# Patient Record
Sex: MALE | Race: WHITE | NOT HISPANIC OR LATINO | Employment: FULL TIME | URBAN - METROPOLITAN AREA
[De-identification: names, ages, dates, MRNs, and addresses within clinical notes are randomized per-mention and may not be internally consistent; named-entity substitution may affect disease eponyms.]

---

## 2018-05-02 ENCOUNTER — PATIENT OUTREACH (OUTPATIENT)
Dept: ADMINISTRATIVE | Facility: HOSPITAL | Age: 63
End: 2018-05-02

## 2018-05-02 NOTE — PROGRESS NOTES
A letter was mailed to the patient on today in regards to the information below.    The patient is due for a hypertension follow up and fasting blood work w/ urine. The patient is also due for immunizations(tetanus and zoster) and a colonoscopy.

## 2018-07-09 ENCOUNTER — OFFICE VISIT (OUTPATIENT)
Dept: URGENT CARE | Facility: CLINIC | Age: 63
End: 2018-07-09
Payer: COMMERCIAL

## 2018-07-09 VITALS
TEMPERATURE: 99 F | HEART RATE: 73 BPM | OXYGEN SATURATION: 96 % | HEIGHT: 69 IN | DIASTOLIC BLOOD PRESSURE: 91 MMHG | BODY MASS INDEX: 23.7 KG/M2 | WEIGHT: 160 LBS | SYSTOLIC BLOOD PRESSURE: 133 MMHG

## 2018-07-09 DIAGNOSIS — Z72.0 TOBACCO USE: ICD-10-CM

## 2018-07-09 DIAGNOSIS — S50.812A ABRASION OF LEFT FOREARM, INITIAL ENCOUNTER: Primary | ICD-10-CM

## 2018-07-09 DIAGNOSIS — S59.912A INJURY OF LEFT FOREARM, INITIAL ENCOUNTER: ICD-10-CM

## 2018-07-09 PROCEDURE — 3080F DIAST BP >= 90 MM HG: CPT | Mod: CPTII,S$GLB,, | Performed by: FAMILY MEDICINE

## 2018-07-09 PROCEDURE — 3075F SYST BP GE 130 - 139MM HG: CPT | Mod: CPTII,S$GLB,, | Performed by: FAMILY MEDICINE

## 2018-07-09 PROCEDURE — 99203 OFFICE O/P NEW LOW 30 MIN: CPT | Mod: S$GLB,,, | Performed by: FAMILY MEDICINE

## 2018-07-09 PROCEDURE — 3008F BODY MASS INDEX DOCD: CPT | Mod: CPTII,S$GLB,, | Performed by: FAMILY MEDICINE

## 2018-07-09 RX ORDER — MUPIROCIN 20 MG/G
OINTMENT TOPICAL
Qty: 22 G | Refills: 1 | Status: SHIPPED | OUTPATIENT
Start: 2018-07-09 | End: 2023-04-26

## 2018-07-09 RX ORDER — NAPROXEN 500 MG/1
500 TABLET ORAL 2 TIMES DAILY WITH MEALS
Qty: 60 TABLET | Refills: 2 | Status: SHIPPED | OUTPATIENT
Start: 2018-07-09 | End: 2019-07-09

## 2018-07-09 NOTE — PROGRESS NOTES
"Subjective:       Patient ID: Dieter Ayala is a 63 y.o. male.    Vitals:  height is 5' 9" (1.753 m) and weight is 72.6 kg (160 lb). His temperature is 98.7 °F (37.1 °C). His blood pressure is 133/91 (abnormal) and his pulse is 73. His oxygen saturation is 96%.     Chief Complaint: Arm Injury    Arm Injury    The incident occurred 1 to 3 hours ago. The incident occurred at home. The injury mechanism was a direct blow. The pain is present in the left forearm. The quality of the pain is described as aching and burning. The pain does not radiate. The pain is at a severity of 10/10. The pain is severe. The pain has been constant since the incident. Pertinent negatives include no chest pain or numbness. The symptoms are aggravated by movement and lifting. He has tried nothing for the symptoms.     Review of Systems   Constitution: Negative for weakness and malaise/fatigue.   HENT: Negative for nosebleeds.    Cardiovascular: Negative for chest pain and syncope.   Respiratory: Negative for shortness of breath.    Musculoskeletal: Positive for joint pain and joint swelling. Negative for back pain and neck pain.   Gastrointestinal: Negative for abdominal pain.   Genitourinary: Negative for hematuria.   Neurological: Negative for dizziness and numbness.   All other systems reviewed and are negative.      Objective:      Physical Exam   Constitutional: He is oriented to person, place, and time. He appears well-developed.   HENT:   Head: Normocephalic.   Nose: Nose normal.   Mouth/Throat: Oropharynx is clear and moist.   Eyes: Conjunctivae and EOM are normal. Pupils are equal, round, and reactive to light.   Cardiovascular: Normal rate and regular rhythm.    Pulmonary/Chest: Breath sounds normal.   Abdominal: Bowel sounds are normal.   Musculoskeletal:        Right forearm: He exhibits tenderness, swelling, edema and laceration.   Neurological: He is alert and oriented to person, place, and time.   Skin: Abrasion, bruising and " "ecchymosis noted. There is erythema.        Contusion on left forearm with large skin tear. Minimal active bleeding.       Assessment:       1. Abrasion of left forearm, initial encounter    2. Injury of left forearm, initial encounter    3. Tobacco use        Plan:         Abrasion of left forearm, initial encounter  -     mupirocin (BACTROBAN) 2 % ointment; Apply to affected area 3 times daily  Dispense: 22 g; Refill: 1  -     elastic bandage 3 X 5 "-yard Bndg; Apply 1 application topically continuous. Applied in clinic    Injury of left forearm, initial encounter  -     X-Ray Forearm Left; Future; Expected date: 07/09/2018  -     naproxen (NAPROSYN) 500 MG tablet; Take 1 tablet (500 mg total) by mouth 2 (two) times daily with meals.  Dispense: 60 tablet; Refill: 2  -     elastic bandage 3 X 5 "-yard Bndg; Apply 1 application topically continuous. Applied in clinic    Tobacco use  -     Ambulatory referral to Smoking Cessation Program      Patient Instructions     Skin Tear (Skin Avulsion)  A skin avulsion is a tearing of the top layer of skin. This commonly happens after a fall or other injury. It also tends to be more common in older people, or those taking blood thinners or steroids for long periods of time.  Home care  These guidelines will help you care for your wound at home:  · Keep the wound clean and dry for the first 24 to 48 hours, or as your healthcare provider advises.  · If there is a dressing or bandage, change it when it gets wet or dirty. Otherwise, leave it on for the first 24 hours, then change it once a day or as often as the doctor says.  · If stitches or staples were used, check the wound every day.  · After taking off the dressing, wash the area gently with soap and water. Clean as close to the stitches as you can. Avoid washing or rubbing the stitches directly.  · After 3 days you can keep the bandages off the wound, unless told otherwise, or there is continued drainage.  Allow the wound to " be open to the air.  · Keep a thin layer of antibiotic ointment on the cut. This will keep the wound clean, make it easier to remove the stitches, and reduce scarring.  · If your wound is oozing, you can put a nonstick dressing over it. Then, reapply the bandage or dressing as you were told.  · You can shower as usual after the first 24 hours, but don't soak the area in water (no baths or swimming) until the stitches or staples are taken out.  · If surgical tape was used, keep the area clean and dry. If it becomes wet, blot it dry with a clean towel.  · If skin glue was used, don't put any creams, lotions, or antibiotic ointments on it. These can dissolve the glue. Usually the glue will flake off in about 5 to 10 days by itself. Try to resist picking it off before that so the wound doesn't open up. When it gets wet, pat it dry.  Here is some information about medicine:  · You may use over-the-counter medicine such as acetaminophen or ibuprofen to control pain, unless another pain medicine was given. If you have chronic liver or kidney disease or ever had a stomach ulcer or gastrointestinal bleeding, talk with your doctor before using these medicines.  · If you were given antibiotics, take them until they are all used up. It is important to finish the antibiotics even if the wound looks better. This will ensure that the infection has cleared.  Follow-up care  Follow up with your healthcare provider, or as advised.  · Watch for any signs of infection, such as increasing redness, swelling, or pus coming out. If this happens, don't wait for your scheduled visit. Instead, see a doctor sooner.  · Stitches or staples are usually taken out within 5 to 14 days. This varies depending on what part of your body they are on, and the type of wound. The doctor will tell you how long stitches should be left in.   · If surgical tape was used, it is usually left on for 7 to 10 days. You can remove surgical tape after that unless you  were told otherwise. If you try to remove it, and it is too hard, soaking can help. Surgical tape strips will eventually fall off on their own. If the edges of the cut pull apart, stop removing the tape or strips and follow up with your doctor  · As mentioned above, skin glue will flake off by itself in 5 to 10 days, so you don't need to pull it off.  If any X-rays were done, you will be notified of any changes that may affect your care.  When to seek medical advice  Call your healthcare provider right away if any of these occur:  · Increasing pain in the wound  · Redness, swelling, or pus coming from the wound  · Fever of 100.4ºF (38ºC) or higher, or as directed by your healthcare provider  · Sutures or staples come apart or fall out before your next appointment and the wound edges look as if they will re-open  · Surgical tape closures fall off before 7 days, and the wound edges look as if they will re-open  · Bleeding not controlled by direct pressure  Date Last Reviewed: 9/1/2016 © 2000-2017 [employer] [address] [place] [address]. All rights reserved. This information is not intended as a substitute for professional medical care. Always follow your healthcare professional's instructions.        Upper Extremity Contusion  You have a contusion (bruise) of an upper extremity (arm, wrist, hand, or fingers). Symptoms include pain, swelling, and skin discoloration. No bones are broken. This injury may take from a few days to a few weeks to heal.  During that time, the bruise may change from reddish in color, to purple-blue, to green-yellow, to yellow-brown.  Home care  · Unless another medicine was prescribed, you can take acetaminophen, ibuprofen, or naproxen to control pain. (If you have chronic liver or kidney disease or ever had a stomach ulcer or gastrointestinal bleeding, talk with your doctor before using these medicines.)  · Elevate the injured area to reduce pain and swelling. As  much as possible, sit or lie down with the injured area raised about the level of your heart. This is especially important during the first 48 hours.  · Ice the injured area to help reduce pain and swelling. Wrap a cold source (ice pack or ice cubes in a plastic bag) in a thin towel. Apply to the bruised area for 20 minutes every 1 to 2 hours the first day. Continue this 3 to 4 times a day until the pain and swelling goes away.  · If a sling was provided, you may remove it to shower or bathe. To prevent joint stiffness, do not wear it for more than 1 week.  Follow-up care  Follow up with your healthcare provide, or as advised. Call if you are not improving within the next 1 to 2 weeks.  When to seek medical advice   Call your healthcare provider right away if any of these occur:  · Increased pain or swelling  · Hand or fingers become cold, blue, numb or tingly  · Signs of infection: Warmth, drainage, or increased redness or pain around the injury  · Inability to move the injured body part   · Frequent bruising for unknown reasons  Date Last Reviewed: 2/1/2017  © 6934-9009 [employer]. [address] [place] [address], [place] [address]. All rights reserved. This information is not intended as a substitute for professional medical care. Always follow your healthcare professional's instructions.

## 2018-07-09 NOTE — PATIENT INSTRUCTIONS
Skin Tear (Skin Avulsion)  A skin avulsion is a tearing of the top layer of skin. This commonly happens after a fall or other injury. It also tends to be more common in older people, or those taking blood thinners or steroids for long periods of time.  Home care  These guidelines will help you care for your wound at home:  · Keep the wound clean and dry for the first 24 to 48 hours, or as your healthcare provider advises.  · If there is a dressing or bandage, change it when it gets wet or dirty. Otherwise, leave it on for the first 24 hours, then change it once a day or as often as the doctor says.  · If stitches or staples were used, check the wound every day.  · After taking off the dressing, wash the area gently with soap and water. Clean as close to the stitches as you can. Avoid washing or rubbing the stitches directly.  · After 3 days you can keep the bandages off the wound, unless told otherwise, or there is continued drainage.  Allow the wound to be open to the air.  · Keep a thin layer of antibiotic ointment on the cut. This will keep the wound clean, make it easier to remove the stitches, and reduce scarring.  · If your wound is oozing, you can put a nonstick dressing over it. Then, reapply the bandage or dressing as you were told.  · You can shower as usual after the first 24 hours, but don't soak the area in water (no baths or swimming) until the stitches or staples are taken out.  · If surgical tape was used, keep the area clean and dry. If it becomes wet, blot it dry with a clean towel.  · If skin glue was used, don't put any creams, lotions, or antibiotic ointments on it. These can dissolve the glue. Usually the glue will flake off in about 5 to 10 days by itself. Try to resist picking it off before that so the wound doesn't open up. When it gets wet, pat it dry.  Here is some information about medicine:  · You may use over-the-counter medicine such as acetaminophen or ibuprofen to control pain,  unless another pain medicine was given. If you have chronic liver or kidney disease or ever had a stomach ulcer or gastrointestinal bleeding, talk with your doctor before using these medicines.  · If you were given antibiotics, take them until they are all used up. It is important to finish the antibiotics even if the wound looks better. This will ensure that the infection has cleared.  Follow-up care  Follow up with your healthcare provider, or as advised.  · Watch for any signs of infection, such as increasing redness, swelling, or pus coming out. If this happens, don't wait for your scheduled visit. Instead, see a doctor sooner.  · Stitches or staples are usually taken out within 5 to 14 days. This varies depending on what part of your body they are on, and the type of wound. The doctor will tell you how long stitches should be left in.   · If surgical tape was used, it is usually left on for 7 to 10 days. You can remove surgical tape after that unless you were told otherwise. If you try to remove it, and it is too hard, soaking can help. Surgical tape strips will eventually fall off on their own. If the edges of the cut pull apart, stop removing the tape or strips and follow up with your doctor  · As mentioned above, skin glue will flake off by itself in 5 to 10 days, so you don't need to pull it off.  If any X-rays were done, you will be notified of any changes that may affect your care.  When to seek medical advice  Call your healthcare provider right away if any of these occur:  · Increasing pain in the wound  · Redness, swelling, or pus coming from the wound  · Fever of 100.4ºF (38ºC) or higher, or as directed by your healthcare provider  · Sutures or staples come apart or fall out before your next appointment and the wound edges look as if they will re-open  · Surgical tape closures fall off before 7 days, and the wound edges look as if they will re-open  · Bleeding not controlled by direct pressure  Date  Last Reviewed: 9/1/2016  © 3342-8081 [employer]. [address] [place] [address], [place] [address]. All rights reserved. This information is not intended as a substitute for professional medical care. Always follow your healthcare professional's instructions.        Upper Extremity Contusion  You have a contusion (bruise) of an upper extremity (arm, wrist, hand, or fingers). Symptoms include pain, swelling, and skin discoloration. No bones are broken. This injury may take from a few days to a few weeks to heal.  During that time, the bruise may change from reddish in color, to purple-blue, to green-yellow, to yellow-brown.  Home care  · Unless another medicine was prescribed, you can take acetaminophen, ibuprofen, or naproxen to control pain. (If you have chronic liver or kidney disease or ever had a stomach ulcer or gastrointestinal bleeding, talk with your doctor before using these medicines.)  · Elevate the injured area to reduce pain and swelling. As much as possible, sit or lie down with the injured area raised about the level of your heart. This is especially important during the first 48 hours.  · Ice the injured area to help reduce pain and swelling. Wrap a cold source (ice pack or ice cubes in a plastic bag) in a thin towel. Apply to the bruised area for 20 minutes every 1 to 2 hours the first day. Continue this 3 to 4 times a day until the pain and swelling goes away.  · If a sling was provided, you may remove it to shower or bathe. To prevent joint stiffness, do not wear it for more than 1 week.  Follow-up care  Follow up with your healthcare provide, or as advised. Call if you are not improving within the next 1 to 2 weeks.  When to seek medical advice   Call your healthcare provider right away if any of these occur:  · Increased pain or swelling  · Hand or fingers become cold, blue, numb or tingly  · Signs of infection: Warmth, drainage, or increased redness or pain around the injury  · Inability  to move the injured body part   · Frequent bruising for unknown reasons  Date Last Reviewed: 2/1/2017  © 8077-0136 [employer]. [address] [place] [address], [place] [address]. All rights reserved. This information is not intended as a substitute for professional medical care. Always follow your healthcare professional's instructions.

## 2019-02-27 ENCOUNTER — OCCUPATIONAL HEALTH (OUTPATIENT)
Dept: URGENT CARE | Facility: CLINIC | Age: 64
End: 2019-02-27

## 2019-02-27 PROCEDURE — 80305 PR COLLECTION ONLY DRUG SCREEN: ICD-10-PCS | Mod: S$GLB,,, | Performed by: EMERGENCY MEDICINE

## 2019-02-27 PROCEDURE — 82075 ASSAY OF BREATH ETHANOL: CPT | Mod: S$GLB,,, | Performed by: EMERGENCY MEDICINE

## 2019-02-27 PROCEDURE — 82075 CHG ASSAY OF BREATH ETHANOL: ICD-10-PCS | Mod: S$GLB,,, | Performed by: EMERGENCY MEDICINE

## 2019-02-27 PROCEDURE — 80305 DRUG TEST PRSMV DIR OPT OBS: CPT | Mod: S$GLB,,, | Performed by: EMERGENCY MEDICINE

## 2020-08-18 ENCOUNTER — OCCUPATIONAL HEALTH (OUTPATIENT)
Dept: URGENT CARE | Facility: CLINIC | Age: 65
End: 2020-08-18

## 2020-08-18 DIAGNOSIS — Z02.89 ENCOUNTER FOR PHYSICAL EXAMINATION RELATED TO EMPLOYMENT: ICD-10-CM

## 2020-08-18 PROCEDURE — 80305 PR COLLECTION ONLY DRUG SCREEN: ICD-10-PCS | Mod: S$GLB,,, | Performed by: EMERGENCY MEDICINE

## 2020-08-18 PROCEDURE — 82075 ASSAY OF BREATH ETHANOL: CPT | Mod: S$GLB,,, | Performed by: EMERGENCY MEDICINE

## 2020-08-18 PROCEDURE — 82075 CHG ASSAY OF BREATH ETHANOL: ICD-10-PCS | Mod: S$GLB,,, | Performed by: EMERGENCY MEDICINE

## 2020-08-18 PROCEDURE — 80305 DRUG TEST PRSMV DIR OPT OBS: CPT | Mod: S$GLB,,, | Performed by: EMERGENCY MEDICINE

## 2020-09-24 ENCOUNTER — OCCUPATIONAL HEALTH (OUTPATIENT)
Dept: URGENT CARE | Facility: CLINIC | Age: 65
End: 2020-09-24

## 2020-09-24 DIAGNOSIS — Z02.89 ENCOUNTER FOR PHYSICAL EXAMINATION RELATED TO EMPLOYMENT: ICD-10-CM

## 2020-09-24 PROCEDURE — 82075 CHG ASSAY OF BREATH ETHANOL: ICD-10-PCS | Mod: S$GLB,,, | Performed by: EMERGENCY MEDICINE

## 2020-09-24 PROCEDURE — 80305 DRUG TEST PRSMV DIR OPT OBS: CPT | Mod: S$GLB,,, | Performed by: EMERGENCY MEDICINE

## 2020-09-24 PROCEDURE — 80305 PR COLLECTION ONLY DRUG SCREEN: ICD-10-PCS | Mod: S$GLB,,, | Performed by: EMERGENCY MEDICINE

## 2020-09-24 PROCEDURE — 82075 ASSAY OF BREATH ETHANOL: CPT | Mod: S$GLB,,, | Performed by: EMERGENCY MEDICINE

## 2022-04-12 ENCOUNTER — HOSPITAL ENCOUNTER (EMERGENCY)
Facility: HOSPITAL | Age: 67
Discharge: HOME | End: 2022-04-13
Attending: INTERNAL MEDICINE
Payer: MEDICARE

## 2022-04-12 VITALS
OXYGEN SATURATION: 97 % | WEIGHT: 160 LBS | DIASTOLIC BLOOD PRESSURE: 97 MMHG | BODY MASS INDEX: 23.7 KG/M2 | HEART RATE: 74 BPM | SYSTOLIC BLOOD PRESSURE: 150 MMHG | RESPIRATION RATE: 14 BRPM | HEIGHT: 69 IN | TEMPERATURE: 98 F

## 2022-04-12 DIAGNOSIS — T15.91XA FOREIGN BODY OF RIGHT EYE, INITIAL ENCOUNTER: Primary | ICD-10-CM

## 2022-04-12 PROCEDURE — 99284 EMERGENCY DEPT VISIT MOD MDM: CPT | Mod: 25,ER

## 2022-04-12 PROCEDURE — 65222 REMOVE FOREIGN BODY FROM EYE: CPT | Mod: RT,ER

## 2022-04-12 PROCEDURE — 65220 REMOVE FOREIGN BODY FROM EYE: CPT | Mod: RT,ER

## 2022-04-12 RX ORDER — PROPARACAINE HYDROCHLORIDE 5 MG/ML
2 SOLUTION/ DROPS OPHTHALMIC
Status: COMPLETED | OUTPATIENT
Start: 2022-04-13 | End: 2022-04-13

## 2022-04-12 RX ADMIN — TETANUS TOXOID, REDUCED DIPHTHERIA TOXOID AND ACELLULAR PERTUSSIS VACCINE, ADSORBED 0.5 ML: 5; 2.5; 8; 8; 2.5 SUSPENSION INTRAMUSCULAR at 11:04

## 2022-04-13 ENCOUNTER — OFFICE VISIT (OUTPATIENT)
Dept: OPTOMETRY | Facility: CLINIC | Age: 67
End: 2022-04-13
Payer: MEDICARE

## 2022-04-13 ENCOUNTER — TELEPHONE (OUTPATIENT)
Dept: OPHTHALMOLOGY | Facility: CLINIC | Age: 67
End: 2022-04-13
Payer: MEDICARE

## 2022-04-13 DIAGNOSIS — H18.891 CORNEAL RUST RING OF RIGHT EYE: Primary | ICD-10-CM

## 2022-04-13 PROBLEM — T15.91XA FOREIGN BODY OF RIGHT EYE: Status: ACTIVE | Noted: 2022-04-13

## 2022-04-13 PROCEDURE — 92002 PR EYE EXAM, NEW PATIENT,INTERMED: ICD-10-PCS | Mod: 25,S$PBB,, | Performed by: OPTOMETRIST

## 2022-04-13 PROCEDURE — 65222 REMOVE FOREIGN BODY FROM EYE: CPT | Mod: S$PBB,RT,, | Performed by: OPTOMETRIST

## 2022-04-13 PROCEDURE — 90471 IMMUNIZATION ADMIN: CPT | Mod: ER | Performed by: INTERNAL MEDICINE

## 2022-04-13 PROCEDURE — 99999 PR PBB SHADOW E&M-EST. PATIENT-LVL II: ICD-10-PCS | Mod: PBBFAC,,, | Performed by: OPTOMETRIST

## 2022-04-13 PROCEDURE — 99999 PR PBB SHADOW E&M-EST. PATIENT-LVL II: CPT | Mod: PBBFAC,,, | Performed by: OPTOMETRIST

## 2022-04-13 PROCEDURE — 90715 TDAP VACCINE 7 YRS/> IM: CPT | Mod: ER | Performed by: INTERNAL MEDICINE

## 2022-04-13 PROCEDURE — 92002 INTRM OPH EXAM NEW PATIENT: CPT | Mod: 25,S$PBB,, | Performed by: OPTOMETRIST

## 2022-04-13 PROCEDURE — 63600175 PHARM REV CODE 636 W HCPCS: Mod: ER | Performed by: INTERNAL MEDICINE

## 2022-04-13 PROCEDURE — 65222 REMOVE FOREIGN BODY FROM EYE: CPT | Mod: PBBFAC,PO,RT | Performed by: OPTOMETRIST

## 2022-04-13 PROCEDURE — 25000003 PHARM REV CODE 250: Mod: ER | Performed by: INTERNAL MEDICINE

## 2022-04-13 PROCEDURE — 99212 OFFICE O/P EST SF 10 MIN: CPT | Mod: PBBFAC,PO,25 | Performed by: OPTOMETRIST

## 2022-04-13 PROCEDURE — 65222 REMOVE FOREIGN BODY FROM EYE: CPT | Mod: RT,ER

## 2022-04-13 PROCEDURE — 65222 PR REMV F.B.,EYE,CORNEA,SLIT LAMP: ICD-10-PCS | Mod: S$PBB,RT,, | Performed by: OPTOMETRIST

## 2022-04-13 RX ORDER — OFLOXACIN 3 MG/ML
1 SOLUTION/ DROPS OPHTHALMIC 4 TIMES DAILY
Qty: 5 ML | Refills: 0 | Status: SHIPPED | OUTPATIENT
Start: 2022-04-13 | End: 2022-04-18

## 2022-04-13 RX ORDER — TOBRAMYCIN AND DEXAMETHASONE 3; 1 MG/ML; MG/ML
2 SUSPENSION/ DROPS OPHTHALMIC
Qty: 5 ML | Refills: 0 | Status: SHIPPED | OUTPATIENT
Start: 2022-04-13 | End: 2022-04-13

## 2022-04-13 RX ADMIN — PROPARACAINE HYDROCHLORIDE 2 DROP: 5 SOLUTION/ DROPS OPHTHALMIC at 12:04

## 2022-04-13 RX ADMIN — FLUORESCEIN SODIUM 1 EACH: 1 STRIP OPHTHALMIC at 12:04

## 2022-04-13 NOTE — PROGRESS NOTES
Subjective:       Patient ID: Dieter Ayala is a 66 y.o. male      Chief Complaint   Patient presents with    Concerns About Ocular Health     History of Present Illness  Dls: 3/18/16 Dr. Degroot    67 y/o male presents today with c/o x 1 day was grinding on metal and fb flew into od was seen in ER last night tried to remove it but could not get it out. Pt was given tobradex gtts od Q 4 Hrs.         Assessment/Plan:     1. Corneal rust ring of right eye  PROCEDURE NOTE:  Corneal foreign body removal. Patient gave verbal consent for removal of corneal foreign body. One drop of proparacaine was instilled in right  eye. Corneal foreign body removed with algerbrush behind slit lamp. Patient tolerated procedure well. No complications.     One drop vigamox  instilled in office.      FB removed in ER. Residual rust ring. Pt request removal. Stop tobradex. Switch to ofloxacin QID OD x 5 days. AT QID.     - ofloxacin (OCUFLOX) 0.3 % ophthalmic solution; Place 1 drop into the right eye 4 (four) times daily. for 5 days  Dispense: 5 mL; Refill: 0    Follow up if symptoms worsen or fail to improve.

## 2022-04-13 NOTE — ED PROVIDER NOTES
Encounter Date: 4/12/2022    SCRIBE #1 NOTE: I, Ivett Bob, am scribing for, and in the presence of,  Remi Minler MD. I have scribed the following portions of the note - Other sections scribed: HPI, ROS, PE.       History     Chief Complaint   Patient presents with    Foreign Body in Eye     Possible metallic foreign body in right eye from sharpening  blade earlier today. Denies vision loss/change. Pain and drainage reported from right eye.     Dieter Ayala is a 66 y.o. male who presents to the ED for evaluation of a foreign body present in his right eye onset earlier today. Pt reports that he was sharpening his  and the wind blew something in his eye. He states that he has right eye pain and discharge. Denies visual disturbance. Pt wife reports seeing the foreign body with a magnifying glass. She also mentions putting visine eye drops to relieve his symptoms.    The history is provided by the patient. No  was used.     Review of patient's allergies indicates:   Allergen Reactions    Sulfa (sulfonamide antibiotics) Hives     No past medical history on file.  Past Surgical History:   Procedure Laterality Date    ADENOIDECTOMY      APPENDECTOMY      TONSILLECTOMY       Family History   Problem Relation Age of Onset    Heart disease Mother     ALS Brother      Social History     Tobacco Use    Smoking status: Current Every Day Smoker   Substance Use Topics    Alcohol use: Yes     Review of Systems   Constitutional: Negative for diaphoresis and fever.   HENT: Negative for sore throat.    Eyes: Positive for pain and discharge. Negative for visual disturbance.   Respiratory: Negative for shortness of breath.    Cardiovascular: Negative for chest pain.   Gastrointestinal: Negative for abdominal pain and nausea.   Genitourinary: Negative for dysuria.   Musculoskeletal: Negative for back pain.   Skin: Negative for rash.   Neurological: Negative for weakness.   All  other systems reviewed and are negative.      Physical Exam     Initial Vitals [04/12/22 2307]   BP Pulse Resp Temp SpO2   (!) 150/97 74 14 97.9 °F (36.6 °C) 97 %      MAP       --         Physical Exam    Nursing note and vitals reviewed.  Constitutional: He appears well-developed and well-nourished.   HENT:   Head: Normocephalic and atraumatic.   Eyes: Conjunctivae are normal.   Slit lamp exam:       The right eye shows foreign body (1 o'clock of the iris). The right eye shows no corneal abrasion.   Neck: Neck supple.   Normal range of motion.  Cardiovascular: Normal rate, regular rhythm and normal heart sounds. Exam reveals no gallop and no friction rub.    No murmur heard.  Pulmonary/Chest: Breath sounds normal. No respiratory distress. He has no wheezes. He has no rhonchi. He has no rales.   Abdominal: Abdomen is soft. There is no abdominal tenderness.   Musculoskeletal:         General: No edema. Normal range of motion.      Cervical back: Normal range of motion and neck supple.     Neurological: He is alert and oriented to person, place, and time. GCS score is 15. GCS eye subscore is 4. GCS verbal subscore is 5. GCS motor subscore is 6.   Skin: Skin is warm and dry.   Psychiatric: He has a normal mood and affect.         ED Course   Foreign Body    Date/Time: 4/13/2022 6:06 AM  Performed by: Remi Milner MD  Authorized by: Remi Milner MD   Consent Done: Yes  Consent: Verbal consent obtained.  Risks and benefits: risks, benefits and alternatives were discussed  Consent given by: patient  Patient understanding: patient states understanding of the procedure being performed  Patient consent: the patient's understanding of the procedure matches consent given  Procedure consent: procedure consent matches procedure scheduled  Body area: eye  Location details: right cornea  Anesthesia: local infiltration    Anesthesia:  Local Anesthetic: proparacaine drops    Patient sedated: no  Patient restrained:  no  Localization method: magnification  Removal mechanism: ophthalmic dorcas  Eye examined with fluorescein.  No fluorescein uptake.  No residual rust ring present.  Complexity: simple  0 objects recovered.  Post-procedure assessment: foreign body not removed  Patient tolerance: Patient tolerated the procedure well with no immediate complications      Labs Reviewed - No data to display       Imaging Results    None          Medications   Tdap (BOOSTRIX) vaccine injection 0.5 mL (0.5 mLs Intramuscular Given 4/12/22 7991)   fluorescein ophthalmic strip 1 each (1 each Left Eye Given 4/13/22 0002)   proparacaine 0.5 % ophthalmic solution 2 drop (2 drops Left Eye Given 4/13/22 0002)     Medical Decision Making:   History:   Old Medical Records: I decided to obtain old medical records.  Initial Assessment:   Dieter Ayala is a 66 y.o. male who presents to the ED for evaluation of a foreign body present in his right eye onset earlier today. Pt reports that he was sharpening his  and the wind blew something in his eye. He states that he has right eye pain. Denies visual disturbance. Pt wife reports seeing the foreign body with a magnifying glass. She also mentions putting visine eye drops to relieve his symptoms.  ED Management:  Foreign body was not removed at this time.  Patient was given prescription for TobraDex and instructions for foreign body in the eye.  Referral to Ophthalmology was given for tomorrow and patient was advised to return to the emergency department if condition worsens.          Scribe Attestation:   Scribe #1: I performed the above scribed service and the documentation accurately describes the services I performed. I attest to the accuracy of the note.               This document was produced by a scribe under my direction and in my presence. I agree with the content of the note and have made any necessary edits.     Dr. Milner    04/13/2022 6:08 AM    Clinical Impression:   Final  diagnoses:  [T15.91XA] Foreign body of right eye, initial encounter (Primary)          ED Disposition Condition    Discharge Stable        ED Prescriptions     Medication Sig Dispense Start Date End Date Auth. Provider    tobramycin-dexamethasone 0.3-0.1% (TOBRADEX) 0.3-0.1 % DrpS Place 2 drops into the right eye every 4 (four) hours while awake. for 5 days 5 mL 4/13/2022 4/18/2022 Remi Milner MD        Follow-up Information     Follow up With Specialties Details Why Contact Info    J Carlos Degroot MD Ophthalmology Schedule an appointment as soon as possible for a visit today For reevaluation [address] [facility] [place] [id number]  [phone]             Remi Milner MD  04/13/22 0608

## 2022-04-13 NOTE — TELEPHONE ENCOUNTER
----- Message from Leah Diallo sent at 4/13/2022  8:14 AM CDT -----  Contact: Kailey @[phone]  Pt wife calling on behalf of her  because he got a piece of steal in his right eye and the ER was unable to get it out

## 2022-05-10 ENCOUNTER — OFFICE VISIT (OUTPATIENT)
Dept: FAMILY MEDICINE | Facility: CLINIC | Age: 67
End: 2022-05-10
Payer: MEDICARE

## 2022-05-10 VITALS
OXYGEN SATURATION: 95 % | TEMPERATURE: 98 F | WEIGHT: 139.31 LBS | BODY MASS INDEX: 20.63 KG/M2 | SYSTOLIC BLOOD PRESSURE: 136 MMHG | DIASTOLIC BLOOD PRESSURE: 82 MMHG | HEART RATE: 86 BPM | HEIGHT: 69 IN

## 2022-05-10 DIAGNOSIS — F17.200 TOBACCO DEPENDENCE: ICD-10-CM

## 2022-05-10 DIAGNOSIS — J20.9 ACUTE BRONCHITIS, UNSPECIFIED ORGANISM: Primary | ICD-10-CM

## 2022-05-10 DIAGNOSIS — R06.02 SHORTNESS OF BREATH: ICD-10-CM

## 2022-05-10 DIAGNOSIS — R05.9 COUGH: ICD-10-CM

## 2022-05-10 PROCEDURE — U0005 INFEC AGEN DETEC AMPLI PROBE: HCPCS | Performed by: STUDENT IN AN ORGANIZED HEALTH CARE EDUCATION/TRAINING PROGRAM

## 2022-05-10 PROCEDURE — 99999 PR PBB SHADOW E&M-EST. PATIENT-LVL III: ICD-10-PCS | Mod: PBBFAC,,, | Performed by: STUDENT IN AN ORGANIZED HEALTH CARE EDUCATION/TRAINING PROGRAM

## 2022-05-10 PROCEDURE — 99999 PR PBB SHADOW E&M-EST. PATIENT-LVL III: CPT | Mod: PBBFAC,,, | Performed by: STUDENT IN AN ORGANIZED HEALTH CARE EDUCATION/TRAINING PROGRAM

## 2022-05-10 PROCEDURE — 99213 PR OFFICE/OUTPT VISIT, EST, LEVL III, 20-29 MIN: ICD-10-PCS | Mod: S$PBB,,, | Performed by: STUDENT IN AN ORGANIZED HEALTH CARE EDUCATION/TRAINING PROGRAM

## 2022-05-10 PROCEDURE — 99213 OFFICE O/P EST LOW 20 MIN: CPT | Mod: PBBFAC,PO | Performed by: STUDENT IN AN ORGANIZED HEALTH CARE EDUCATION/TRAINING PROGRAM

## 2022-05-10 PROCEDURE — U0003 INFECTIOUS AGENT DETECTION BY NUCLEIC ACID (DNA OR RNA); SEVERE ACUTE RESPIRATORY SYNDROME CORONAVIRUS 2 (SARS-COV-2) (CORONAVIRUS DISEASE [COVID-19]), AMPLIFIED PROBE TECHNIQUE, MAKING USE OF HIGH THROUGHPUT TECHNOLOGIES AS DESCRIBED BY CMS-2020-01-R: HCPCS | Performed by: STUDENT IN AN ORGANIZED HEALTH CARE EDUCATION/TRAINING PROGRAM

## 2022-05-10 PROCEDURE — 99213 OFFICE O/P EST LOW 20 MIN: CPT | Mod: S$PBB,,, | Performed by: STUDENT IN AN ORGANIZED HEALTH CARE EDUCATION/TRAINING PROGRAM

## 2022-05-10 RX ORDER — METHYLPREDNISOLONE 4 MG/1
TABLET ORAL
Qty: 21 EACH | Refills: 0 | Status: SHIPPED | OUTPATIENT
Start: 2022-05-10 | End: 2022-05-31

## 2022-05-10 RX ORDER — AZITHROMYCIN 250 MG/1
TABLET, FILM COATED ORAL
Qty: 6 TABLET | Refills: 0 | Status: SHIPPED | OUTPATIENT
Start: 2022-05-10 | End: 2022-05-15

## 2022-05-10 NOTE — PROGRESS NOTES
"05/10/2022    Dieter Ayala  [id number]    Chief Complaint   Patient presents with    Cough       HPI    URI for the lat 1.5 weeks  Hx of bronchitis 2-3 years   Was coughing up green sputum but that has resolved  Now still with cough, SOB and clear-casie phlegm   Has taken some Nyquil  Does not use inhalers  Also with some diarrhea  No fevers  Tolerating regular diet  Tobacco dependece: 1 ppd        Negative 10 point ROS outside of HPI    Social History     Socioeconomic History    Marital status:    Tobacco Use    Smoking status: Current Every Day Smoker    Smokeless tobacco: Never Used   Substance and Sexual Activity    Alcohol use: Yes           Current Outpatient Medications:     elastic bandage 3 X 5 "-yard Bndg, Apply 1 application topically continuous. Applied in clinic (Patient not taking: Reported on 5/10/2022), Disp: , Rfl:     MULTIVIT-IRON-MIN-FOLIC ACID 3,500-18-0.4 UNIT-MG-MG ORAL CHEW, Take by mouth., Disp: , Rfl:     mupirocin (BACTROBAN) 2 % ointment, Apply to affected area 3 times daily (Patient not taking: Reported on 5/10/2022), Disp: 22 g, Rfl: 1      Physical Exam  Vitals:    05/10/22 1130   BP: 136/82   Pulse: 86   Temp: 97.6 °F (36.4 °C)     Gen: well appearing, NAD  Resp: non labored breathing, poor air movement, quiet at bases, upper lung fields clear, no crackles   CV: RRR no murmur, gallops, rubs, no LE edema    1. Acute bronchitis, unspecified organism  - methylPREDNISolone (MEDROL DOSEPACK) 4 mg tablet; use as directed  Dispense: 21 each; Refill: 0  - azithromycin (Z-BHAVANA) 250 MG tablet; Take 2 tablets by mouth on day 1; Take 1 tablet by mouth on days 2-5  Dispense: 6 tablet; Refill: 0    2. Shortness of breath  - COVID-19 Routine Screening    3. Cough  - COVID-19 Routine Screening    4. Tobacco dependence  discussed smoking cessation and like some undiagnosed chronic lung disease which could be contributing to his illness    RTC if worsening or no improvement in 1-2 " oracio Monson MD  Family Medicine

## 2022-05-11 LAB — SARS-COV-2 RNA RESP QL NAA+PROBE: NOT DETECTED

## 2022-08-23 ENCOUNTER — OFFICE VISIT (OUTPATIENT)
Dept: FAMILY MEDICINE | Facility: CLINIC | Age: 67
End: 2022-08-23
Payer: MEDICARE

## 2022-08-23 ENCOUNTER — HOSPITAL ENCOUNTER (OUTPATIENT)
Dept: RADIOLOGY | Facility: HOSPITAL | Age: 67
Discharge: HOME | End: 2022-08-23
Attending: INTERNAL MEDICINE
Payer: MEDICARE

## 2022-08-23 VITALS
HEART RATE: 69 BPM | SYSTOLIC BLOOD PRESSURE: 128 MMHG | BODY MASS INDEX: 19.3 KG/M2 | DIASTOLIC BLOOD PRESSURE: 88 MMHG | TEMPERATURE: 99 F | HEIGHT: 69 IN | OXYGEN SATURATION: 95 % | WEIGHT: 130.31 LBS

## 2022-08-23 DIAGNOSIS — R06.09 DOE (DYSPNEA ON EXERTION): ICD-10-CM

## 2022-08-23 DIAGNOSIS — F17.200 TOBACCO DEPENDENCE: ICD-10-CM

## 2022-08-23 DIAGNOSIS — Z12.12 SCREENING FOR COLORECTAL CANCER: ICD-10-CM

## 2022-08-23 DIAGNOSIS — N40.0 BENIGN PROSTATIC HYPERPLASIA, UNSPECIFIED WHETHER LOWER URINARY TRACT SYMPTOMS PRESENT: ICD-10-CM

## 2022-08-23 DIAGNOSIS — D69.2 SENILE PURPURA: ICD-10-CM

## 2022-08-23 DIAGNOSIS — R63.4 WEIGHT LOSS: Primary | ICD-10-CM

## 2022-08-23 DIAGNOSIS — T14.8XXA BRUISING: ICD-10-CM

## 2022-08-23 DIAGNOSIS — I10 ESSENTIAL HYPERTENSION: ICD-10-CM

## 2022-08-23 DIAGNOSIS — Z00.00 ROUTINE MEDICAL EXAM: ICD-10-CM

## 2022-08-23 DIAGNOSIS — Z12.11 SCREENING FOR COLORECTAL CANCER: ICD-10-CM

## 2022-08-23 DIAGNOSIS — Z12.5 SCREENING FOR PROSTATE CANCER: ICD-10-CM

## 2022-08-23 PROCEDURE — 99999 PR PBB SHADOW E&M-EST. PATIENT-LVL III: ICD-10-PCS | Mod: PBBFAC,,, | Performed by: INTERNAL MEDICINE

## 2022-08-23 PROCEDURE — 99213 OFFICE O/P EST LOW 20 MIN: CPT | Mod: PBBFAC,25,PO | Performed by: INTERNAL MEDICINE

## 2022-08-23 PROCEDURE — 71046 XR CHEST PA AND LATERAL: ICD-10-PCS | Mod: 26,,, | Performed by: RADIOLOGY

## 2022-08-23 PROCEDURE — 71046 X-RAY EXAM CHEST 2 VIEWS: CPT | Mod: 26,,, | Performed by: RADIOLOGY

## 2022-08-23 PROCEDURE — 99215 PR OFFICE/OUTPT VISIT, EST, LEVL V, 40-54 MIN: ICD-10-PCS | Mod: S$PBB,,, | Performed by: INTERNAL MEDICINE

## 2022-08-23 PROCEDURE — 99999 PR PBB SHADOW E&M-EST. PATIENT-LVL III: CPT | Mod: PBBFAC,,, | Performed by: INTERNAL MEDICINE

## 2022-08-23 PROCEDURE — 71046 X-RAY EXAM CHEST 2 VIEWS: CPT | Mod: TC,FY,PO

## 2022-08-23 PROCEDURE — 99215 OFFICE O/P EST HI 40 MIN: CPT | Mod: S$PBB,,, | Performed by: INTERNAL MEDICINE

## 2022-08-23 NOTE — PROGRESS NOTES
Chief complaint:  Physical, establish care, bruising, short of breath, weight loss    Patient is a 67-year-old white male here with his female partner.  Both of them really do not go see the doctor.  He is he has noticed some bruising on his forearms only.  We discussed is probably normal in age related due to minor trauma.  No other bruising or bleeding.  No other known history of any liver disease.  Regarding health maintenance he is overdue for lab work.  Never had any colon screening is open to doing:  Guarded but not colonoscopy.  Discussed need to get a PSA every year.  He has had decreased appetite over the last couple of years and lost about 45 lb over two years.  No early satiety or dysphagia.  He does not have symptoms of depression.  No symptoms of thyroid dysfunction or tachycardia.  He has a brother with AL S.  He does smoke one pack per day.  We discussed getting a chest x-ray.  His dyspnea on exertion is stable.  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  He does have expiratory wheezing counseling and probably has underlying COPD.    Patient does have Medicare which does not formally cover a physical exam but all of his health assessment and cancer screening will be addressed today.    ROS:   CONST: weight stable but lost weight over two years.. EYES: no vision change. ENT: no sore throat. CV: no chest pain w/ exertion. RESP: no shortness of breath that is new.. GI: no nausea, vomiting, diarrhea. No dysphagia. : no urinary issues. MUSCULOSKELETAL: no new myalgias or arthralgias. SKIN: no new changes. NEURO: no focal deficits. PSYCH: no new issues. ENDOCRINE: no polyuria. HEME: no lymph nodes. ALLERGY: no general pruritis.      Past Medical History:   Diagnosis Date    Hypertension     Tobacco dependence 3/18/2016     Past Surgical History:   Procedure Laterality Date    ADENOIDECTOMY      APPENDECTOMY      TONSILLECTOMY       Social  History     Socioeconomic History    Marital status:    Tobacco Use    Smoking status: Current Every Day Smoker    Smokeless tobacco: Never Used   Substance and Sexual Activity    Alcohol use: Yes     family history includes ALS in his brother; Heart disease in his mother; No Known Problems in his father, maternal aunt, maternal grandfather, maternal grandmother, maternal uncle, paternal aunt, paternal grandfather, paternal grandmother, paternal uncle, and sister.  Sister had hepatitis B and apparently mom had some hepatitis B and so he has antibodies    Gen: no distress  EYES: conjunctiva clear, non-icteric, PERRL  ENT: nose clear, nasal mucosa normal, oropharynx clear and moist, teeth poor  NECK:supple, thyroid non-palpable  RESP: effort is good, lungs and expiratory wheezing throughout  CV: heart RRR w/o murmur, gallops or rubs; no carotid bruits, no edema  GI: abdomen soft, non-distended, non-tender, no hepatosplenomegaly  MS: gait normal, no clubbing or cyanosis of the digits  SKIN: no rashes, warm to touch, black seborrheic keratosis on the right temple, senile purpura and ecchymosis on the forearms only       Over 70 min  minutes of total time spent on the encounter, which includes face to face time and non-face to face time preparing to see the patient (eg, review of tests), Obtaining and/or reviewing separately obtained history, Documenting clinical information in the electronic or other health record, Independently interpreting results (not separately reported) and communicating results to the patient/family/caregiver, or Care coordination (not separately reported).    Prior labs reviewed     Dieter was seen today for bleeding/bruising.    Diagnoses and all orders for this visit:    Weight loss, smoker, no other obvious causes of weight loss, he has had some reduced intake due to reduced appetite which may well be a cause but obviously will update cancer screening, check labs, chest x-ray and so  forth  -     Cologuard Screening (Multitarget Stool DNA); Future  -     Comprehensive Metabolic Panel; Future  -     Prostate Specific Antigen, Diagnostic; Future  -     CBC Auto Differential; Future  -     TSH; Future  -     T4, Free; Future  -     Lipid Panel; Future  -     Sedimentation rate; Future  -     Cologuard Screening (Multitarget Stool DNA)  -     APTT; Future  -     Protime-INR; Future    Bruising, reassured, likely normal age-related due to minor trauma but check lab work  -     Comprehensive Metabolic Panel; Future  -     Prostate Specific Antigen, Diagnostic; Future  -     CBC Auto Differential; Future  -     TSH; Future  -     T4, Free; Future  -     Lipid Panel; Future  -     Sedimentation rate; Future  -     APTT; Future  -     Protime-INR; Future    Essential hypertension, chronic and stable  -     Comprehensive Metabolic Panel; Future  -     Prostate Specific Antigen, Diagnostic; Future  -     CBC Auto Differential; Future  -     TSH; Future  -     T4, Free; Future  -     Lipid Panel; Future  -     Sedimentation rate; Future    Screening for prostate cancer, overdue  -     Prostate Specific Antigen, Diagnostic; Future    Screening for colorectal cancer, overdue  -     Cologuard Screening (Multitarget Stool DNA); Future  -     Cologuard Screening (Multitarget Stool DNA)    Tobacco dependence  -     X-Ray Chest PA And Lateral; Future    Benign prostatic hyperplasia, unspecified whether lower urinary tract symptoms present  -     Prostate Specific Antigen, Diagnostic; Future    BOLAND (dyspnea on exertion), appears stable, with the wheezing probably has underlying COPD but not limiting in any way  -     X-Ray Chest PA And Lateral; Future    Routine medical exam    Senile purpura

## 2022-09-27 LAB — NONINV COLON CA DNA+OCC BLD SCRN STL QL: POSITIVE

## 2022-09-28 ENCOUNTER — TELEPHONE (OUTPATIENT)
Dept: FAMILY MEDICINE | Facility: CLINIC | Age: 67
End: 2022-09-28
Payer: MEDICARE

## 2022-09-28 DIAGNOSIS — R19.5 POSITIVE COLORECTAL CANCER SCREENING USING COLOGUARD TEST: Primary | ICD-10-CM

## 2022-09-28 NOTE — TELEPHONE ENCOUNTER
Please call, patient had Cologuard, chest x-ray and lab work     Dr. Healy says the recent Cologuard test came back positive so Dr. Healy does strongly recommend getting a colonoscopy set up especially with all the recent weight loss     All the recent lab work done was normal and the chest x-ray showed some scarring and emphysema but otherwise nothing to explain weight loss.

## 2022-10-12 ENCOUNTER — TELEPHONE (OUTPATIENT)
Dept: ENDOSCOPY | Facility: HOSPITAL | Age: 67
End: 2022-10-12
Payer: MEDICARE

## 2022-10-19 ENCOUNTER — TELEPHONE (OUTPATIENT)
Dept: ENDOSCOPY | Facility: HOSPITAL | Age: 67
End: 2022-10-19
Payer: MEDICARE

## 2022-10-19 NOTE — TELEPHONE ENCOUNTER
Called patient to schedule for a colonoscopy.  No answer. Left message for patient to call Endoscopy Scheduling. Phone number provided.

## 2022-10-26 ENCOUNTER — TELEPHONE (OUTPATIENT)
Dept: ENDOSCOPY | Facility: HOSPITAL | Age: 67
End: 2022-10-26
Payer: MEDICARE

## 2022-10-26 NOTE — TELEPHONE ENCOUNTER
Called patient to schedule for an urgent colonoscopy. No answer. LVM for patient to call endoscopy scheduling to schedule. Phone number provided.

## 2022-11-04 ENCOUNTER — TELEPHONE (OUTPATIENT)
Dept: ENDOSCOPY | Facility: HOSPITAL | Age: 67
End: 2022-11-04
Payer: MEDICARE

## 2023-03-20 ENCOUNTER — HOSPITAL ENCOUNTER (EMERGENCY)
Facility: HOSPITAL | Age: 68
Discharge: HOME | End: 2023-03-20
Attending: EMERGENCY MEDICINE
Payer: MEDICARE

## 2023-03-20 VITALS
OXYGEN SATURATION: 98 % | RESPIRATION RATE: 18 BRPM | SYSTOLIC BLOOD PRESSURE: 141 MMHG | TEMPERATURE: 98 F | HEART RATE: 67 BPM | DIASTOLIC BLOOD PRESSURE: 86 MMHG

## 2023-03-20 DIAGNOSIS — Z72.0 TOBACCO ABUSE: ICD-10-CM

## 2023-03-20 DIAGNOSIS — J44.1 COPD WITH ACUTE EXACERBATION: ICD-10-CM

## 2023-03-20 DIAGNOSIS — R06.09 DOE (DYSPNEA ON EXERTION): Primary | ICD-10-CM

## 2023-03-20 LAB
ALBUMIN SERPL-MCNC: 3.8 G/DL (ref 3.3–5.5)
ALP SERPL-CCNC: 76 U/L (ref 42–141)
BILIRUB SERPL-MCNC: 0.8 MG/DL (ref 0.2–1.6)
BUN SERPL-MCNC: 15 MG/DL (ref 7–22)
CALCIUM SERPL-MCNC: 10.1 MG/DL (ref 8–10.3)
CHLORIDE SERPL-SCNC: 100 MMOL/L (ref 98–108)
CREAT SERPL-MCNC: 1.2 MG/DL (ref 0.6–1.2)
GLUCOSE SERPL-MCNC: 91 MG/DL (ref 73–118)
POC ALT (SGPT): 24 U/L (ref 10–47)
POC AST (SGOT): 30 U/L (ref 11–38)
POC B-TYPE NATRIURETIC PEPTIDE: 57.2 PG/ML (ref 0–100)
POC CARDIAC TROPONIN I: 0 NG/ML (ref 0–0.08)
POC D-DI: 1110 NG/ML (ref 0–450)
POC TCO2: 30 MMOL/L (ref 18–33)
POTASSIUM BLD-SCNC: 3.5 MMOL/L (ref 3.6–5.1)
PROTEIN, POC: 7 G/DL (ref 6.4–8.1)
SAMPLE: NORMAL
SODIUM BLD-SCNC: 143 MMOL/L (ref 128–145)

## 2023-03-20 PROCEDURE — 25500020 PHARM REV CODE 255: Mod: ER | Performed by: EMERGENCY MEDICINE

## 2023-03-20 PROCEDURE — 80053 COMPREHEN METABOLIC PANEL: CPT | Mod: ER

## 2023-03-20 PROCEDURE — 63600175 PHARM REV CODE 636 W HCPCS: Mod: ER | Performed by: EMERGENCY MEDICINE

## 2023-03-20 PROCEDURE — 84484 ASSAY OF TROPONIN QUANT: CPT | Mod: ER

## 2023-03-20 PROCEDURE — 85379 FIBRIN DEGRADATION QUANT: CPT | Mod: ER

## 2023-03-20 PROCEDURE — 99285 EMERGENCY DEPT VISIT HI MDM: CPT | Mod: 25,ER

## 2023-03-20 PROCEDURE — 96374 THER/PROPH/DIAG INJ IV PUSH: CPT | Mod: 59,ER

## 2023-03-20 PROCEDURE — 83880 ASSAY OF NATRIURETIC PEPTIDE: CPT | Mod: ER

## 2023-03-20 PROCEDURE — 85025 COMPLETE CBC W/AUTO DIFF WBC: CPT | Mod: ER

## 2023-03-20 PROCEDURE — 25000003 PHARM REV CODE 250: Mod: ER | Performed by: EMERGENCY MEDICINE

## 2023-03-20 RX ORDER — PREDNISONE 20 MG/1
40 TABLET ORAL DAILY
Qty: 10 TABLET | Refills: 0 | Status: SHIPPED | OUTPATIENT
Start: 2023-03-20 | End: 2023-03-25

## 2023-03-20 RX ORDER — ALBUTEROL SULFATE 90 UG/1
2 AEROSOL, METERED RESPIRATORY (INHALATION) EVERY 4 HOURS PRN
Qty: 18 G | Refills: 0 | Status: SHIPPED | OUTPATIENT
Start: 2023-03-20 | End: 2023-04-26

## 2023-03-20 RX ORDER — METHYLPREDNISOLONE SOD SUCC 125 MG
125 VIAL (EA) INJECTION
Status: COMPLETED | OUTPATIENT
Start: 2023-03-20 | End: 2023-03-20

## 2023-03-20 RX ORDER — ACETAMINOPHEN 500 MG
1000 TABLET ORAL
Status: COMPLETED | OUTPATIENT
Start: 2023-03-20 | End: 2023-03-20

## 2023-03-20 RX ADMIN — ACETAMINOPHEN 1000 MG: 500 TABLET, FILM COATED ORAL at 07:03

## 2023-03-20 RX ADMIN — IOHEXOL 100 ML: 350 INJECTION, SOLUTION INTRAVENOUS at 06:03

## 2023-03-20 RX ADMIN — METHYLPREDNISOLONE SODIUM SUCCINATE 125 MG: 125 INJECTION, POWDER, FOR SOLUTION INTRAMUSCULAR; INTRAVENOUS at 07:03

## 2023-03-20 NOTE — Clinical Note
"Dieter"Diana Ayala was seen and treated in our emergency department on 3/20/2023.  He may return to work on 03/21/2023.       If you have any questions or concerns, please don't hesitate to call.      Felisha Muñoz MD"

## 2023-03-20 NOTE — ED PROVIDER NOTES
Encounter Date: 3/20/2023    SCRIBE #1 NOTE: I, Dori Fox, am scribing for, and in the presence of,  Felisha Muñoz MD. I have scribed the following portions of the note - Other sections scribed: HPI; ROS; PE.     History     Chief Complaint   Patient presents with    Shortness of Breath     Dieter Ayala is a 67 y.o. male smoker with Hx of HTN who presents to the ED for chief complaint of chronic intermittent shortness of breath that began over 1 year ago. Associated symptoms are decreased appetite, weight loss, and headache. Patient reports he is short of breath with exertion and has to stop to catch his breath. He states the shortness of breath began after he had COVID in August 2019. Patient notes the shortness of breath is resolved with rest. He denies associated nausea or diaphoresis. No further complaints at this time. He did see a PCP for this but no answer was found. Last night at work he had a severe episode. He thinks it was triggered by going up and down a lot of stairs. His BP was elevated and he was given a clonidine. Symptoms have continued since last night. Patient uses tobacco and marijuana. He does not have any medical allergies.       The history is provided by the patient. No  was used.   Review of patient's allergies indicates:   Allergen Reactions    Sulfa (sulfonamide antibiotics) Hives     Past Medical History:   Diagnosis Date    Hypertension     Tobacco dependence 3/18/2016     Past Surgical History:   Procedure Laterality Date    ADENOIDECTOMY      APPENDECTOMY      TONSILLECTOMY       Family History   Problem Relation Age of Onset    Heart disease Mother     ALS Brother     No Known Problems Father     No Known Problems Sister     No Known Problems Maternal Aunt     No Known Problems Maternal Uncle     No Known Problems Paternal Aunt     No Known Problems Paternal Uncle     No Known Problems Maternal Grandmother     No Known Problems Maternal Grandfather     No  Known Problems Paternal Grandmother     No Known Problems Paternal Grandfather      Social History     Tobacco Use    Smoking status: Every Day    Smokeless tobacco: Never   Substance Use Topics    Alcohol use: Yes     Review of Systems   Constitutional:  Positive for appetite change and unexpected weight change. Negative for activity change, chills and fever.   HENT:  Negative for congestion, rhinorrhea, sneezing and sore throat.    Respiratory:  Positive for shortness of breath. Negative for cough, chest tightness and wheezing.    Cardiovascular:  Negative for chest pain and palpitations.   Gastrointestinal:  Negative for abdominal pain, diarrhea, nausea and vomiting.   Skin:  Negative for rash.   Neurological:  Positive for headaches. Negative for dizziness and light-headedness.   All other systems reviewed and are negative.    Physical Exam     Initial Vitals   BP Pulse Resp Temp SpO2   03/20/23 1558 03/20/23 1557 03/20/23 1557 03/20/23 1610 03/20/23 1557   126/80 90 (!) 22 98.1 °F (36.7 °C) 97 %      MAP       --                Physical Exam    Nursing note and vitals reviewed.  Constitutional: No distress.   Thin male, smells of smoke   HENT:   Head: Normocephalic and atraumatic.   Eyes: Conjunctivae are normal.   Neck:   Normal range of motion.  Cardiovascular:  Normal rate and regular rhythm.           No murmur heard.  Pulmonary/Chest: Breath sounds normal. No respiratory distress. He has no wheezes. He has no rhonchi. He has no rales.   Abdominal: Bowel sounds are normal. He exhibits no distension. There is no abdominal tenderness.   Musculoskeletal:         General: No tenderness or edema. Normal range of motion.      Cervical back: Normal range of motion.      Right lower leg: No edema.      Left lower leg: No edema.     Neurological: He is alert and oriented to person, place, and time. GCS score is 15. GCS eye subscore is 4. GCS verbal subscore is 5. GCS motor subscore is 6.   Skin: Skin is warm and  dry. No rash noted.   Psychiatric: He has a normal mood and affect. His behavior is normal.       ED Course   Procedures  Labs Reviewed   POCT CMP - Abnormal; Notable for the following components:       Result Value    POC Potassium 3.5 (*)     All other components within normal limits   POCT D DIMER - Abnormal; Notable for the following components:    POC D-DI 1110 (*)     All other components within normal limits   TROPONIN ISTAT   POCT CBC   POCT CMP   POCT TROPONIN   POCT B-TYPE NATRIURETIC PEPTIDE (BNP)   POCT D DIMER   POCT B-TYPE NATRIURETIC PEPTIDE (BNP)       EKG Readings: (Independently Interpreted)   Initial Reading: No STEMI. Rhythm: Normal Sinus Rhythm. Heart Rate: 84. Ectopy: No Ectopy. Conduction: Normal. ST Segments: Normal ST Segments. T Waves: Normal. Q Waves: V1, V2 and V3. Clinical Impression: Normal Sinus Rhythm Other Impression: independently interpreted by me     Imaging Results              CTA Chest Non-Coronary (PE Studies) (Final result)  Result time 03/20/23 18:56:58      Final result by Jerilyn Minaya MD (03/20/23 18:56:58)                   Impression:      No evidence of acute pulmonary embolism. Extensive emphysematous changes.      Electronically signed by: Jerilyn Minaya  Date:    03/20/2023  Time:    18:56               Narrative:    EXAMINATION:  CT PULMONARY ANGIOGRAM WITH CONTRAST    CLINICAL HISTORY:  History of hypertension presenting for shortness of breath.    TECHNIQUE:  CT of the chest with intravenous contrast for pulmonary artery angiogram was performed. Contiguous axial 1.25 mm images followed by 10 mm reconstructions with multiplanar and MIP reformations of the pulmonary arteries. No 3D post-angiographic imaging was performed on an independent workstation and reviewed.  One hundred ml of Omnipaque 350 was injected.    COMPARISON:  None.    FINDINGS:  There is no evidence of pulmonary artery filling defect to suggest pulmonary embolism. There is no aortic aneurysm  or aortic dissection.  There is tortuosity of the descending thoracic aorta.  Mild vascular calcifications seen at the aortic knob.    There are extensive emphysematous changes.    There is no evidence of mediastinal, hilar, or axillary adenopathy.    There is no pleural or pericardial effusion.    The heart size is within normal limits for size.    Spondylitic changes are present.                                       X-Ray Chest PA And Lateral (Final result)  Result time 03/20/23 16:54:47      Final result by Arturo King MD (03/20/23 16:54:47)                   Impression:      No acute intrathoracic process.    No radiographic evidence of CHF.    Emphysematous changes.      Electronically signed by: Arturo King MD  Date:    03/20/2023  Time:    16:54               Narrative:    EXAMINATION:  XR CHEST PA AND LATERAL    CLINICAL HISTORY:  CHF;    TECHNIQUE:  PA and lateral views of the chest were performed.    COMPARISON:  08/23/2022.    FINDINGS:  The trachea is unremarkable.  There are calcifications of the aortic knob.  The cardiomediastinal silhouette is unchanged.  There are no pleural effusions.  There is no evidence of a pneumothorax.  There is no evidence of pneumomediastinum.  There are emphysematous changes.  No airspace opacity is present.  There are degenerative changes in the osseous structures.                                       Medications   iohexoL (OMNIPAQUE 350) injection 100 mL (100 mLs Intravenous Given 3/20/23 1839)   methylPREDNISolone sodium succinate injection 125 mg (125 mg Intravenous Given 3/20/23 1924)   acetaminophen tablet 1,000 mg (1,000 mg Oral Given 3/20/23 1924)     Medical Decision Making:   History:   Old Medical Records: I decided to obtain old medical records.  Independently Interpreted Test(s):   I have ordered and independently interpreted X-rays - see prior notes.  I have ordered and independently interpreted EKG Reading(s) - see prior notes  Clinical Tests:   Lab  Tests: Ordered and Reviewed  Radiological Study: Reviewed and Ordered  Medical Tests: Ordered and Reviewed  ED Management:  67 y.o. male with Hx of HTN presents with chronic intermittent shortness of breath that began over 1 year ago presents with acute exacerbation. Associated decreased appetite and headache. On exam, breath sounds clear and no lower extremity swelling. No fever, no distress, no wheezing. Smells of smoke. Work up with no signs of arrhythmia, acute MI, CHF, anemia. No pneumonia or pneumothorax on CXR but does have COPD changes. D dimer is elevated so will get CTA to rule out PE. No PE. Suspect COPD - will treat with steroids and inhaler. Stop smoking. FOllow up with pulmonary.        Scribe Attestation:   Scribe #1: I performed the above scribed service and the documentation accurately describes the services I performed. I attest to the accuracy of the note.            I, Dr. Felisha Muñoz, personally performed the services described in this documentation.   All medical record entries made by the scribe were at my direction and in my presence.   I have reviewed the chart and agree that the record is accurate and complete.   Felisha Muñoz MD.  4:14 PM 03/21/2023          Clinical Impression:   Final diagnoses:  [R06.09] BOLAND (dyspnea on exertion) (Primary)  [J44.1] COPD with acute exacerbation  [Z72.0] Tobacco abuse        ED Disposition Condition    Discharge Stable          ED Prescriptions       Medication Sig Dispense Start Date End Date Auth. Provider    predniSONE (DELTASONE) 20 MG tablet Take 2 tablets (40 mg total) by mouth once daily. for 5 days 10 tablet 3/20/2023 3/25/2023 Felisha Muñoz MD    albuterol (PROVENTIL/VENTOLIN HFA) 90 mcg/actuation inhaler Inhale 2 puffs into the lungs every 4 (four) hours as needed for Wheezing or Shortness of Breath. Rescue 18 g 3/20/2023 -- Felisha Muñoz MD          Follow-up Information       Follow up With Specialties Details Why Contact Info    Kody MALONE  MD [facility] Schedule an appointment as soon as possible for a visit   [address] [facility]  [place] [id number]  [phone]               Felisha Muñoz MD  03/21/23 4285

## 2023-03-20 NOTE — ED NOTES
Pt began with SOB yesterday while  walking in parking lot  SOB begins with any exertion     Reports HA and decreased intake

## 2023-03-21 NOTE — DISCHARGE INSTRUCTIONS
You have COPD. Stop smoking. Take the prescribed medicines as directed. Follow up with your primary care doctor. You need referral to a pulmonary doctor for further evaluation.

## 2023-04-03 ENCOUNTER — TELEPHONE (OUTPATIENT)
Dept: FAMILY MEDICINE | Facility: CLINIC | Age: 68
End: 2023-04-03
Payer: MEDICARE

## 2023-04-26 ENCOUNTER — OFFICE VISIT (OUTPATIENT)
Dept: FAMILY MEDICINE | Facility: CLINIC | Age: 68
End: 2023-04-26
Payer: MEDICARE

## 2023-04-26 ENCOUNTER — PES CALL (OUTPATIENT)
Dept: ADMINISTRATIVE | Facility: CLINIC | Age: 68
End: 2023-04-26
Payer: MEDICARE

## 2023-04-26 ENCOUNTER — TELEPHONE (OUTPATIENT)
Dept: FAMILY MEDICINE | Facility: CLINIC | Age: 68
End: 2023-04-26
Payer: MEDICARE

## 2023-04-26 VITALS
WEIGHT: 131.81 LBS | OXYGEN SATURATION: 95 % | TEMPERATURE: 98 F | DIASTOLIC BLOOD PRESSURE: 58 MMHG | HEIGHT: 69 IN | HEART RATE: 83 BPM | SYSTOLIC BLOOD PRESSURE: 112 MMHG | BODY MASS INDEX: 19.52 KG/M2

## 2023-04-26 DIAGNOSIS — I10 ESSENTIAL HYPERTENSION: ICD-10-CM

## 2023-04-26 DIAGNOSIS — J44.1 COPD EXACERBATION: Primary | ICD-10-CM

## 2023-04-26 DIAGNOSIS — F17.200 TOBACCO DEPENDENCE: ICD-10-CM

## 2023-04-26 DIAGNOSIS — I70.0 AORTIC ATHEROSCLEROSIS: ICD-10-CM

## 2023-04-26 DIAGNOSIS — D69.2 SENILE PURPURA: ICD-10-CM

## 2023-04-26 DIAGNOSIS — R19.5 POSITIVE COLORECTAL CANCER SCREENING USING COLOGUARD TEST: ICD-10-CM

## 2023-04-26 DIAGNOSIS — R63.4 WEIGHT LOSS: ICD-10-CM

## 2023-04-26 PROCEDURE — 99999 PR PBB SHADOW E&M-EST. PATIENT-LVL III: CPT | Mod: PBBFAC,,, | Performed by: INTERNAL MEDICINE

## 2023-04-26 PROCEDURE — 99215 OFFICE O/P EST HI 40 MIN: CPT | Mod: S$PBB,,, | Performed by: INTERNAL MEDICINE

## 2023-04-26 PROCEDURE — 99215 PR OFFICE/OUTPT VISIT, EST, LEVL V, 40-54 MIN: ICD-10-PCS | Mod: S$PBB,,, | Performed by: INTERNAL MEDICINE

## 2023-04-26 PROCEDURE — 99999 PR PBB SHADOW E&M-EST. PATIENT-LVL III: ICD-10-PCS | Mod: PBBFAC,,, | Performed by: INTERNAL MEDICINE

## 2023-04-26 PROCEDURE — 99213 OFFICE O/P EST LOW 20 MIN: CPT | Mod: PBBFAC,PO | Performed by: INTERNAL MEDICINE

## 2023-04-26 RX ORDER — ALBUTEROL SULFATE 90 UG/1
2 AEROSOL, METERED RESPIRATORY (INHALATION) EVERY 4 HOURS PRN
Qty: 18 G | Refills: 12 | Status: SHIPPED | OUTPATIENT
Start: 2023-04-26

## 2023-04-26 NOTE — PROGRESS NOTES
Chief complaint:   Follow-up from the ER    Patient is a 67-year-old white male here with his female partner new 8/22.  Both of them really do not go see the doctor.  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  He lot of coughing and his blood pressure was high offshore but normal by the time he got to the ER.  He was sent in from the offshore rig.  He was not given an albuterol treatment or any antibiotics.  He was given prednisone and a steroid shot.  His wife gave him some leftover antibiotics from a tooth and he did get better over about two week period of time and discussed he may well have been getting better on his own and antibiotic probably did not give him any benefit.  He does have some occasional coughing when he breathes or talks which likely suggest allergies and they have other allergy symptoms we discussed using a Claritin every day.  He did try the albuterol inhaler given any felt funny and we did discuss that it can increase heart rate and make you shaky.  He was holding his breath 10 seconds in between each puff which also likely made him lightheaded.      We discussed COPD at length as well as the extensive emphysema seen on the CT and that is irreversible.  There could be a reversible component if he has congestion and wheezing which could be smoker's cough, allergies and or COPD.  I encouraged him to use the inhaler to see if it allows him to do more with less shortness of breath but otherwise he really did not have any respiratory compromise prior.  We discussed maintenance medication with inhaled steroid should he find benefit from the albuterol and need to use the albuterol fairly often.     He is he has noticed some bruising on his forearms only.  We discussed is probably normal in age related due to minor trauma.  No other bruising or bleeding.  No other known history of any liver disease.    Lab work was all normal.    Regarding health maintenance he is overdue for lab  work.  Never had any colon screening is open to doing coloGuard but not colonoscopy- test POS 9/22.    Discussed the possibility of polyps and or colon cancer and the significant need for colonoscopy and after that long discussion they are willing to and were encouraged to follow through when they get a call to schedule.    4 calls by scheduling for colon  Called patient to schedule for a colonoscopy. No answer. Left message for patient to call Endoscopy Scheduling. Phone number provided.    Discussed need to get a PSA every year.  He has had decreased appetite over the last couple of years and lost about 45 lb over two years.  No early satiety or dysphagia.  He does not have symptoms of depression.  No symptoms of thyroid dysfunction or tachycardia.  He has a brother with AL S.  He does smoke one pack per day.  We discussed getting a chest x-ray.  His dyspnea on exertion is stable.  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  He does have expiratory wheezing counseling and probably has underlying COPD.        ROS:   CONST: weight stable but lost weight over two years.. EYES: no vision change. ENT: no sore throat. CV: no chest pain w/ exertion. RESP: no shortness of breath that is new.. GI: no nausea, vomiting, diarrhea. No dysphagia. : no urinary issues. MUSCULOSKELETAL: no new myalgias or arthralgias. SKIN: no new changes. NEURO: no focal deficits. PSYCH: no new issues. ENDOCRINE: no polyuria. HEME: no lymph nodes. ALLERGY: no general pruritis.      Past Medical History:   Diagnosis Date    Hypertension     Tobacco dependence 3/18/2016   Pos cologuard 9/22    Past Surgical History:   Procedure Laterality Date    ADENOIDECTOMY      APPENDECTOMY      TONSILLECTOMY       Social History     Socioeconomic History    Marital status:    Tobacco Use    Smoking status: Every Day     Packs/day: 1.00     Years: 50.00     Pack years: 50.00     Types:  Cigarettes     Passive exposure: Past    Smokeless tobacco: Never   Substance and Sexual Activity    Alcohol use: Yes     family history includes ALS in his brother; Heart disease in his mother; No Known Problems in his father, maternal aunt, maternal grandfather, maternal grandmother, maternal uncle, paternal aunt, paternal grandfather, paternal grandmother, paternal uncle, and sister.  Sister had hepatitis B and apparently mom had some hepatitis B and so he has antibodies    Gen: no distress  EYES: conjunctiva clear, non-icteric, PERRL  ENT: nose clear, nasal mucosa normal, oropharynx clear and moist, teeth poor  NECK:supple, thyroid non-palpable  RESP: effort is good, lungs and expiratory wheezing throughout  CV: heart RRR w/o murmur, gallops or rubs; no carotid bruits, no edema  GI: abdomen soft, non-distended, non-tender, no hepatosplenomegaly  MS: gait normal, no clubbing or cyanosis of the digits  SKIN: no rashes, warm to touch, black seborrheic keratosis on the right temple, senile purpura and ecchymosis on the forearms only       Over 70 min  minutes of total time spent on the encounter, which includes face to face time and non-face to face time preparing to see the patient (eg, review of tests), Obtaining and/or reviewing separately obtained history, Documenting clinical information in the electronic or other health record, Independently interpreting results (not separately reported) and communicating results to the patient/family/caregiver, or Care coordination (not separately reported).    Prior labs reviewed     Diagnoses and all orders for this visit:    COPD exacerbation , pathophysiology of COPD and emphysema discussed and encouraged them to work on reduction and smoking and or smoking cessation.  Try albuterol to assess if there is any perceived benefit.  Consider inhaled steroids if indeed use of albuterol is frequent and beneficial    Positive colorectal cancer screening using Cologuard test  -      Ambulatory referral/consult to Endo Procedure ; Future    Essential hypertension. , chronic and stable    Tobacco dependence    Weight loss, appears to have stabilized, lab work was all unremarkable obviously needs to continue and for fill all his cancer screening requirements especially colonoscopy in light of the positive Cologuard test    Senile purpura    Aortic atherosclerosis    Other orders  -     albuterol (PROVENTIL/VENTOLIN HFA) 90 mcg/actuation inhaler; Inhale 2 puffs into the lungs every 4 (four) hours as needed for Wheezing or Shortness of Breath. Rescue

## 2023-04-26 NOTE — TELEPHONE ENCOUNTER
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 Asking for call back

## 2023-05-27 ENCOUNTER — OFFICE VISIT (OUTPATIENT)
Dept: FAMILY MEDICINE | Facility: CLINIC | Age: 68
End: 2023-05-27
Payer: MEDICARE

## 2023-05-27 VITALS
HEART RATE: 69 BPM | HEIGHT: 69 IN | TEMPERATURE: 98 F | DIASTOLIC BLOOD PRESSURE: 88 MMHG | BODY MASS INDEX: 18.92 KG/M2 | SYSTOLIC BLOOD PRESSURE: 126 MMHG | WEIGHT: 127.75 LBS | OXYGEN SATURATION: 95 %

## 2023-05-27 DIAGNOSIS — L02.214 ABSCESS OF LEFT GROIN: Primary | ICD-10-CM

## 2023-05-27 DIAGNOSIS — R19.5 POSITIVE COLORECTAL CANCER SCREENING USING COLOGUARD TEST: ICD-10-CM

## 2023-05-27 DIAGNOSIS — R63.4 UNINTENTIONAL WEIGHT LOSS: ICD-10-CM

## 2023-05-27 PROCEDURE — 99214 PR OFFICE/OUTPT VISIT, EST, LEVL IV, 30-39 MIN: ICD-10-PCS | Mod: S$PBB,,, | Performed by: FAMILY MEDICINE

## 2023-05-27 PROCEDURE — 99214 OFFICE O/P EST MOD 30 MIN: CPT | Mod: PBBFAC,PO | Performed by: FAMILY MEDICINE

## 2023-05-27 PROCEDURE — 99214 OFFICE O/P EST MOD 30 MIN: CPT | Mod: S$PBB,,, | Performed by: FAMILY MEDICINE

## 2023-05-27 PROCEDURE — 99999 PR PBB SHADOW E&M-EST. PATIENT-LVL IV: ICD-10-PCS | Mod: PBBFAC,,, | Performed by: FAMILY MEDICINE

## 2023-05-27 PROCEDURE — 99999 PR PBB SHADOW E&M-EST. PATIENT-LVL IV: CPT | Mod: PBBFAC,,, | Performed by: FAMILY MEDICINE

## 2023-05-27 RX ORDER — DOXYCYCLINE 100 MG/1
100 CAPSULE ORAL EVERY 12 HOURS
Qty: 20 CAPSULE | Refills: 0 | Status: SHIPPED | OUTPATIENT
Start: 2023-05-27 | End: 2023-06-06

## 2023-05-27 NOTE — PROGRESS NOTES
"Assessment & Plan:    Abscess of left groin  -     doxycycline (VIBRAMYCIN) 100 MG Cap; Take 1 capsule (100 mg total) by mouth every 12 (twelve) hours. for 10 days  Dispense: 20 capsule; Refill: 0  -     Ambulatory referral/consult to General Surgery; Future; Expected date: 06/03/2023    Start doxycycline. Tylenol prn for pain.  Referral to General Surgery for I&D.  Seek ER eval if no improvement in 48 hours.     Unintentional weight loss  Recommended to supplement with protein shakes daily.     Positive colorectal cancer screening using Cologuard test  -     Ambulatory referral/consult to Endo Procedure ; Future; Expected date: 05/28/2023    Positive Cologuard in August 2022. Will schedule diagnostic colonoscopy.       Follow-up: Follow up if symptoms worsen or fail to improve.  ______________________________________________________________________    Chief Complaint  Chief Complaint   Patient presents with    Groin Pain     4-5 days, left side       HPI  Dieter Ayala is a 68 y.o. male with medical diagnoses as listed in the medical history and problem list that presents to the office c/o left-sided groin "boil" that he first noticed about 4-5 days ago. Patient states that it began as a small "bump" and has increased in size and became red and painful. He has had an abscess behind the left knee before and realizes that it may need to be drained. Upon closing our visit, he also c/o unintentional weight loss. Positive Cologuard in the fall of last year but diagnostic colonoscopy was lost to follow up.    Health Maintenance         Date Due Completion Date    Hepatitis C Screening Never done ---    COVID-19 Vaccine (1) Never done ---    High Dose Statin Never done ---    Shingles Vaccine (1 of 2) Never done ---    Pneumococcal Vaccines (Age 65+) (2 - PCV) 03/18/2017 3/18/2016    Abdominal Aortic Aneurysm Screening Never done ---    Influenza Vaccine (Season Ended) 09/01/2023 ---    LDCT Lung Screen " 03/20/2024 3/20/2023    Colorectal Cancer Screening 09/22/2025 9/22/2022    TETANUS VACCINE 04/12/2032 4/12/2022              PAST MEDICAL HISTORY:  Past Medical History:   Diagnosis Date    Hypertension     Tobacco dependence 3/18/2016       PAST SURGICAL HISTORY:  Past Surgical History:   Procedure Laterality Date    ADENOIDECTOMY      APPENDECTOMY      TONSILLECTOMY         SOCIAL HISTORY:  Social History     Socioeconomic History    Marital status:    Tobacco Use    Smoking status: Every Day     Packs/day: 1.00     Years: 50.00     Pack years: 50.00     Types: Cigarettes     Passive exposure: Past    Smokeless tobacco: Never   Substance and Sexual Activity    Alcohol use: Yes       FAMILY HISTORY:  Family History   Problem Relation Age of Onset    Heart disease Mother     ALS Brother     No Known Problems Father     No Known Problems Sister     No Known Problems Maternal Aunt     No Known Problems Maternal Uncle     No Known Problems Paternal Aunt     No Known Problems Paternal Uncle     No Known Problems Maternal Grandmother     No Known Problems Maternal Grandfather     No Known Problems Paternal Grandmother     No Known Problems Paternal Grandfather        ALLERGIES AND MEDICATIONS: updated and reviewed.  Review of patient's allergies indicates:   Allergen Reactions    Sulfa (sulfonamide antibiotics) Hives     Current Outpatient Medications   Medication Sig Dispense Refill    albuterol (PROVENTIL/VENTOLIN HFA) 90 mcg/actuation inhaler Inhale 2 puffs into the lungs every 4 (four) hours as needed for Wheezing or Shortness of Breath. Rescue 18 g 12    MULTIVIT-IRON-MIN-FOLIC ACID 3,500-18-0.4 UNIT-MG-MG ORAL CHEW Take by mouth.      doxycycline (VIBRAMYCIN) 100 MG Cap Take 1 capsule (100 mg total) by mouth every 12 (twelve) hours. for 10 days 20 capsule 0     No current facility-administered medications for this visit.         ROS  Review of Systems   Constitutional:  Positive for unexpected weight  "change. Negative for activity change, appetite change, chills and fever.   Genitourinary:  Negative for dysuria, frequency, hematuria, penile pain, scrotal swelling, testicular pain and urgency.   Skin:  Positive for color change.   Hematological:  Negative for adenopathy.         Physical Exam  Vitals:    05/27/23 0917   BP: 126/88   BP Location: Right arm   Patient Position: Sitting   BP Method: Medium (Manual)   Pulse: 69   Temp: 97.6 °F (36.4 °C)   TempSrc: Oral   SpO2: 95%   Weight: 58 kg (127 lb 12.1 oz)   Height: 5' 9" (1.753 m)    Body mass index is 18.87 kg/m².  Weight: 58 kg (127 lb 12.1 oz)   Height: 5' 9" (175.3 cm)   Physical Exam  Constitutional:       General: He is not in acute distress.     Appearance: Normal appearance.   HENT:      Head: Normocephalic and atraumatic.   Skin:     General: Skin is warm and dry.      Comments: Abscess on the left lower groin   Neurological:      Mental Status: He is alert. Mental status is at baseline.   Psychiatric:         Mood and Affect: Mood normal.         Behavior: Behavior normal.           "

## 2023-05-29 ENCOUNTER — TELEPHONE (OUTPATIENT)
Dept: FAMILY MEDICINE | Facility: CLINIC | Age: 68
End: 2023-05-29
Payer: MEDICARE

## 2023-05-29 DIAGNOSIS — R19.5 POSITIVE COLORECTAL CANCER SCREENING USING COLOGUARD TEST: Primary | ICD-10-CM

## 2023-05-29 NOTE — TELEPHONE ENCOUNTER
Please advise,      DO BONN[facility] Staff; Kody Healy MD  Recently saw patient for a groin abscess but he mentioned unintentional weight loss. Looks like he had a positive Cologuard in August but diagnostic colonoscopy was lost to follow up. I have ordered the diagnostic colonoscopy but I am Cc'ing the primary and staff to make sure this has been ordered correctly. I am unaware of another way to order diagnostic colonoscopies so please let me know if this order should be entered differently.

## 2023-06-05 ENCOUNTER — PES CALL (OUTPATIENT)
Dept: ADMINISTRATIVE | Facility: CLINIC | Age: 68
End: 2023-06-05
Payer: MEDICARE

## 2023-07-08 ENCOUNTER — TELEPHONE (OUTPATIENT)
Dept: ENDOSCOPY | Facility: HOSPITAL | Age: 68
End: 2023-07-08
Payer: MEDICARE

## 2023-07-08 NOTE — TELEPHONE ENCOUNTER
Contacted the patient to schedule an endoscopy procedure(s) 6/20 , 7/7 and 7/8. The patient did not answer the call and left a voice message requesting a call back.

## 2023-07-13 ENCOUNTER — PATIENT OUTREACH (OUTPATIENT)
Dept: ADMINISTRATIVE | Facility: HOSPITAL | Age: 68
End: 2023-07-13
Payer: MEDICARE

## 2023-07-21 ENCOUNTER — TELEPHONE (OUTPATIENT)
Dept: ENDOSCOPY | Facility: HOSPITAL | Age: 68
End: 2023-07-21
Payer: MEDICARE

## 2023-07-21 DIAGNOSIS — Z12.11 ENCOUNTER FOR SCREENING COLONOSCOPY FOR NON-HIGH-RISK PATIENT: Primary | ICD-10-CM

## 2023-07-21 NOTE — TELEPHONE ENCOUNTER
Spoke to py to schedule procedure(s) Colonoscopy       Physician to perform procedure(s) Dr. Manriquez  Date of Procedure (s) 10/2/23  Arrival Time 7:30 AM  Time of Procedure(s) 8:30 AM   Location of Procedure(s) [facility] 4th Floor  Type of Rx Prep sent to patient: PEG  Instructions provided to patient via Email    Patient was informed on the following information and verbalized understanding. Screening questionnaire reviewed with patient and complete. If procedure requires anesthesia, a responsible adult needs to be present to accompany the patient home, patient cannot drive after receiving anesthesia. Appointment details are tentative, especially check-in time. Patient will receive a prep-op call 4 days prior to confirm check-in time for procedure. If applicable the patient should contact their pharmacy to verify Rx for procedure prep is ready for pick-up. Patient was advised to call the scheduling department at [phone] if pharmacy states no Rx is available. Patient was advised to call the endoscopy scheduling department if any questions or concerns arise.      [facility] Scheduling Department

## 2023-07-21 NOTE — TELEPHONE ENCOUNTER
Colonoscopy Procedure Prep Instructions  Date of procedure: 10/2/23Arrive at: 8:00 AM    Location of Department:   [facility] [place] [address]  Take the Elevators to 2nd Floor Endoscopy Procedural Area    As soon as possible:   your prep from pharmacy and over the counter DULCOLAX LAXATIVE TABLETS            On the day before your procedure   What You CAN do:   You may have clear liquids ONLY-see below for list.     Liquids That Are OK to Drink:   Water  Sports drinks (Gatorade, Power-Aid)  Coffee or tea (no cream or nondairy creamer)  Clear juices without pulp (apple, white grape)  Gelatin desserts (no fruit or toppings)  Clear soda (sprite, coke, ginger ale)  Chicken broth (until 12 midnight the night before procedure)    What You CANNOT do:   Do not EAT solid food, drink milk or anything   colored red.  Do not drink alcohol.  Do not take oral medications within 1 hour of starting   each dose of prep.  No gum chewing or candy morning of procedure                       Note:   (Please disregard the insert instructions from pharmacy).  PEG Bowel Prep is indicated for cleansing of the colon as a preparation for colonoscopy in adults.   Be sure to tell your doctor about all the medicines you take, including prescription and non-prescription medicines, vitamins, and herbal supplements. PEG Bowel Prep may affect how other medicines work.  Medication taken by mouth may not be absorbed properly when taken within 1 hour before the start of each dose of PEG Bowel Prep.    It is not uncommon to experience some abdominal cramping, nausea and/or vomiting when taking the prep. If you have nausea and/or vomiting while taking the prep, stop drinking for 20 to 30 minutes then continue.      How to take prep:    PEG Bowel Prep is a (2-day) prep.    One (1) bottle of prep are required for a complete preparation for colonoscopy. Dilute the solution concentrate as directed prior to use.  You must drink water with each dose of prep, and additional water after each dose.    DOSE 1--Day Before Colonoscopy 10/1/23     Drink at least 6 to 8 glasses of clear liquids from time you wake up until you begin your prep and then continue until bedtime to avoid dehydration.     12:00 pm (NOON) Mix your entire container of prep with lukewarm water and refrigerate. Take four (4) Dulcolax (Bisacodyl) tablets with at least 8 ounces or more of clear liquids.       6:00 pm:    You must complete Steps 1 and 2 below before going to bed:    Step 1-Drink half the liquid in the container within one (1) hour.   Step 2-Refrigerate the remaining half of the liquid for dose 2. See below when to begin this step.                       IMPORTANT: If you experience preparation-related symptoms (for example, nausea, bloating, or cramping), stop, or slow the rate of drinking the additional water until your symptoms decrease.    DOSE 2--Day of the Colonoscopy 10/2/23 at 2-3 AM.    For this dose, repeat Step 1 shown above using the remaining half of the liquid prep.   You may continue drinking water/clear liquids until   4 hours before your colonoscopy or as directed by the scheduling nurse  5:00 AM.    For more information about your procedure, please watch this informational video. It is important to watch this animated consent video prior to your arrival.   If you haven't watched the video prior to arriving, you are required to watch it during admission which can cause delays.     Options for viewing:  Using a keyboard:  press and hold the control tab (Ctrl) and left mouse click to follow link          Colonoscopy Instructional Video                                                        OR    Type link address into your web browser's address bar:  [email or web address]    Using a mobile phone: tap on web address/link.     Comments:        Are you ready for your Colonoscopy?      __ If you take blood  thinners, have you stopped taking them according to your doctor's instructions before your procedures?  __ Have you stopped eating solid foods and followed a clear liquid diet a full day before your procedure or followed the diet       guidelines indicated in your instructions? REMINDER: NO BROTH AFTER MIDNIGHT THE DAY BEFORE PROCEDURE.   __ Have you completed all your prep solution? PLEASE DO NOT FOLLOW the insert/or box instructions from pharmacy)  __ Have you taken your blood pressure, heart, seizure, or other essential medications the morning of your procedure?  __ Have you planned for a ride to and from procedure?  (Medical Transportation, Uber, Lyft, Taxi, etc. may ONLY be used if a responsible adult is present to accompany you home). The responsible adult CANNOT be the  of the service.       Questions or Concerns?  Please call us!  [phone] (M-F) [phone] (Nights and weekends)    Listed below are some helpful tips:    Please bring protective cases for eyewear and hearing aids-wear comfortable clothing/shoes.  Follow prep instructions closely so you don't have to do it twice.  Bowel prep is prescription used to clean out the colon before a colonoscopy. The prep increases movement of your colon by causing you to have diarrhea (loose stools). Cleaning out stool from the colon helps your doctor to see in your colon clearly during this procedure.   It is important to stay hydrated before, during and after bowel prep to prevent loss of fluid (dehydration). You can have water and your choice of clear liquids. Reminder: these liquids you will drink in addition to the bowel prep.     Preparing the mixture:    First, mix the prep with water.  Make the taste better by adding a sugar free drink mix (Crystal Light) can improve the taste of your prep.   Use a large bore (opening) straw. Place towards the back of mouth (throat) as tolerated.  Prepare a prep mixture that is lightly chilled, but not ice-cold.  Drinking a large amount of ice-cold liquid can make you feel very ill.  Avoid drinking any RED beverages or eating popsicles with this color for the 24 hours leading up to your procedure. This color can look like blood in the colon.    Consuming the prep:    Take your time. If you feel ill, take a 15-minute break from drinking the prep mixture  Combat hunger and dehydration with clear liquids. Options like JELL-O, popsicles, or chicken broth will help.  Settle in with good reading material. The goal is to clean out 6 feet of colon, so you can plan on spending a good deal of time in the bathroom. Have some good reading material on-hand or an iPad ready to keep yourself entertained!  Keep yourself comfortable. We recommend applying personal hygiene wipes, Tucks pads and a soothing ointment, like A&D ointment, Desitin, or Vaseline to your bottom before starting and as needed to protect your skin.

## 2023-07-27 ENCOUNTER — PES CALL (OUTPATIENT)
Dept: ADMINISTRATIVE | Facility: CLINIC | Age: 68
End: 2023-07-27
Payer: MEDICARE

## 2023-09-18 ENCOUNTER — HOSPITAL ENCOUNTER (EMERGENCY)
Facility: HOSPITAL | Age: 68
Discharge: HOME | End: 2023-09-18
Attending: STUDENT IN AN ORGANIZED HEALTH CARE EDUCATION/TRAINING PROGRAM
Payer: MEDICARE

## 2023-09-18 VITALS
HEART RATE: 78 BPM | OXYGEN SATURATION: 99 % | TEMPERATURE: 98 F | BODY MASS INDEX: 18.96 KG/M2 | SYSTOLIC BLOOD PRESSURE: 124 MMHG | DIASTOLIC BLOOD PRESSURE: 73 MMHG | HEIGHT: 69 IN | WEIGHT: 128 LBS | RESPIRATION RATE: 18 BRPM

## 2023-09-18 DIAGNOSIS — J44.1 COPD EXACERBATION: Primary | ICD-10-CM

## 2023-09-18 DIAGNOSIS — R06.02 SHORTNESS OF BREATH: ICD-10-CM

## 2023-09-18 LAB
ALBUMIN SERPL-MCNC: 4.1 G/DL (ref 3.3–5.5)
ALP SERPL-CCNC: 81 U/L (ref 42–141)
BILIRUB SERPL-MCNC: 0.6 MG/DL (ref 0.2–1.6)
BUN SERPL-MCNC: 15 MG/DL (ref 7–22)
CALCIUM SERPL-MCNC: 10 MG/DL (ref 8–10.3)
CHLORIDE SERPL-SCNC: 100 MMOL/L (ref 98–108)
CREAT SERPL-MCNC: 1.4 MG/DL (ref 0.6–1.2)
CTP QC/QA: YES
GLUCOSE SERPL-MCNC: 153 MG/DL (ref 73–118)
INFLUENZA A ANTIGEN, POC: NEGATIVE
INFLUENZA B ANTIGEN, POC: NEGATIVE
POC ALT (SGPT): 18 U/L (ref 10–47)
POC AST (SGOT): 24 U/L (ref 11–38)
POC B-TYPE NATRIURETIC PEPTIDE: 25.9 PG/ML (ref 0–100)
POC CARDIAC TROPONIN I: 0 NG/ML (ref 0–0.08)
POC TCO2: 33 MMOL/L (ref 18–33)
POTASSIUM BLD-SCNC: 4.2 MMOL/L (ref 3.6–5.1)
PROTEIN, POC: 7.4 G/DL (ref 6.4–8.1)
SAMPLE: NORMAL
SARS-COV-2 RDRP RESP QL NAA+PROBE: NEGATIVE
SODIUM BLD-SCNC: 139 MMOL/L (ref 128–145)

## 2023-09-18 PROCEDURE — 94760 N-INVAS EAR/PLS OXIMETRY 1: CPT | Mod: ER

## 2023-09-18 PROCEDURE — 93010 EKG 12-LEAD: ICD-10-PCS | Mod: ,,, | Performed by: INTERNAL MEDICINE

## 2023-09-18 PROCEDURE — 93005 ELECTROCARDIOGRAM TRACING: CPT | Mod: ER

## 2023-09-18 PROCEDURE — 25000242 PHARM REV CODE 250 ALT 637 W/ HCPCS: Mod: ER | Performed by: STUDENT IN AN ORGANIZED HEALTH CARE EDUCATION/TRAINING PROGRAM

## 2023-09-18 PROCEDURE — 93010 ELECTROCARDIOGRAM REPORT: CPT | Mod: ,,, | Performed by: INTERNAL MEDICINE

## 2023-09-18 PROCEDURE — 99285 EMERGENCY DEPT VISIT HI MDM: CPT | Mod: 25,ER

## 2023-09-18 PROCEDURE — 94640 AIRWAY INHALATION TREATMENT: CPT | Mod: ER

## 2023-09-18 PROCEDURE — 87635 SARS-COV-2 COVID-19 AMP PRB: CPT | Mod: ER | Performed by: NURSE PRACTITIONER

## 2023-09-18 PROCEDURE — 87804 INFLUENZA ASSAY W/OPTIC: CPT | Mod: 59,ER

## 2023-09-18 RX ORDER — AZITHROMYCIN 250 MG/1
250 TABLET, FILM COATED ORAL DAILY
Qty: 6 TABLET | Refills: 0 | Status: SHIPPED | OUTPATIENT
Start: 2023-09-18 | End: 2024-01-22

## 2023-09-18 RX ORDER — PREDNISONE 20 MG/1
40 TABLET ORAL DAILY
Qty: 10 TABLET | Refills: 0 | Status: SHIPPED | OUTPATIENT
Start: 2023-09-18 | End: 2023-09-23

## 2023-09-18 RX ORDER — IPRATROPIUM BROMIDE AND ALBUTEROL SULFATE 2.5; .5 MG/3ML; MG/3ML
3 SOLUTION RESPIRATORY (INHALATION) ONCE
Status: COMPLETED | OUTPATIENT
Start: 2023-09-18 | End: 2023-09-18

## 2023-09-18 RX ADMIN — IPRATROPIUM BROMIDE AND ALBUTEROL SULFATE 3 ML: .5; 3 SOLUTION RESPIRATORY (INHALATION) at 05:09

## 2023-09-18 NOTE — ED PROVIDER NOTES
Encounter Date: 9/18/2023    SCRIBE #1 NOTE: I, Cassie Mckeon, am scribing for, and in the presence of,  Yasmine Baer DO. I have scribed the following portions of the note - Other sections scribed: HPI, ROS, PE.       History     Chief Complaint   Patient presents with    Shortness of Breath     A 69 y/o, w/HX of COPD,  male presents to the ER c/o intermittent SOB x 6 months. Pt reports increased SOB today. SP02 94. Denies CP, Dizziness. Increased SOB on exertion. Pt reports having to use Albuterol inhaler more frequently.      Dieter Ayala is a 68 y.o. male, with a PMHx of COPD, HTN, who presents to the ED with shortness of breath today. Patient reports chronic SOB for the last year that improved temporarily, but worsened in the last 1.5 weeks. Also reports productive cough (clear/white mucus) and congestion in the last 1.5 week. He further reports SOB exacerbation as he lays down. Denies oxygen treatment at home. Uses albuterol inhaler more than usual lately, 2-3x/day. Patient admits to smoking tobacco daily. Reports losing 60 lbs in last 2 years.History provided by an independent historian, patient's wife reports approx. 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 Patient has been experiencing breathing problems that began a few months after mold exposure. No other exacerbating or alleviating factors. Denies fever, chills, chest pain, nausea, vomiting, diarrhea or other associated symptoms. Patient has colonoscopy scheduled for 10/2/2023.  Denies alcohol or drug use.      The history is provided by the patient and the spouse. No  was used.     Review of patient's allergies indicates:   Allergen Reactions    Sulfa (sulfonamide antibiotics) Hives     Past Medical History:   Diagnosis Date    COPD  (chronic obstructive pulmonary disease)     Hypertension     Tobacco dependence 03/18/2016     Past Surgical History:   Procedure Laterality Date    ADENOIDECTOMY      APPENDECTOMY      TONSILLECTOMY       Family History   Problem Relation Age of Onset    Heart disease Mother     ALS Brother     No Known Problems Father     No Known Problems Sister     No Known Problems Maternal Aunt     No Known Problems Maternal Uncle     No Known Problems Paternal Aunt     No Known Problems Paternal Uncle     No Known Problems Maternal Grandmother     No Known Problems Maternal Grandfather     No Known Problems Paternal Grandmother     No Known Problems Paternal Grandfather      Social History     Tobacco Use    Smoking status: Every Day     Current packs/day: 1.00     Average packs/day: 1 pack/day for 50.0 years (50.0 ttl pk-yrs)     Types: Cigarettes     Passive exposure: Past    Smokeless tobacco: Never   Substance Use Topics    Alcohol use: Yes     Review of Systems   Constitutional:  Negative for chills and fever.   HENT:  Positive for congestion. Negative for drooling, facial swelling and trouble swallowing.    Eyes:  Negative for redness and visual disturbance.   Respiratory:  Positive for cough (productive; white/clear) and shortness of breath. Negative for stridor.    Cardiovascular:  Negative for chest pain.        (+) foot swelling, chronic/unchanged   Gastrointestinal:  Negative for abdominal pain, diarrhea, nausea and vomiting.   Genitourinary:  Negative for dysuria and hematuria.   Musculoskeletal:  Negative for gait problem and neck stiffness.   Skin:  Negative for pallor and wound.   Neurological:  Negative for syncope, facial asymmetry, speech difficulty, weakness, light-headedness and headaches.   Psychiatric/Behavioral:  Negative for confusion.    All other systems reviewed and are negative.      Physical Exam     Initial Vitals [09/18/23 1555]   BP Pulse Resp Temp SpO2   (!) 136/91 81 18 97.5 °F (36.4 °C) (!)  94 %      MAP       --         Physical Exam    Nursing note and vitals reviewed.  Constitutional: Vital signs are normal. He appears well-developed. He is not diaphoretic.  Non-toxic appearance. He does not have a sickly appearance. He does not appear ill.   HENT:   Head: Normocephalic and atraumatic.   Mouth/Throat: Uvula is midline, oropharynx is clear and moist and mucous membranes are normal.   Eyes: Conjunctivae and EOM are normal. Pupils are equal, round, and reactive to light. No scleral icterus.   Neck: Neck supple. No JVD present.   Normal range of motion.  Cardiovascular:  Normal rate, regular rhythm, normal heart sounds and intact distal pulses.           Pulmonary/Chest: No stridor. No respiratory distress. He has wheezes.   Expiratory wheezing diffusely.    Abdominal: Abdomen is soft. He exhibits no distension. There is no abdominal tenderness. There is no rebound and no guarding.   Musculoskeletal:         General: No tenderness or edema. Normal range of motion.      Cervical back: Normal range of motion and neck supple. No rigidity. No spinous process tenderness.     Neurological: He is alert and oriented to person, place, and time. He has normal strength. He displays a negative Romberg sign.   Skin: Skin is warm and dry. No rash noted. No erythema.   Psychiatric: He has a normal mood and affect.         ED Course   Procedures        Labs Reviewed   POCT CMP - Abnormal; Notable for the following components:       Result Value    POC Creatinine 1.4 (*)     POC Glucose 153 (*)     All other components within normal limits   TROPONIN ISTAT   SARS-COV-2 RDRP GENE    Narrative:     This test utilizes isothermal nucleic acid amplification technology to detect the SARS-CoV-2 RdRp nucleic acid segment. The analytical sensitivity (limit of detection) is 500 copies/swab.     A POSITIVE result is indicative of the presence of SARS-CoV-2 RNA; clinical correlation with patient history and other diagnostic  information is necessary to determine patient infection status.    A NEGATIVE result means that SARS-CoV-2 nucleic acids are not present above the limit of detection. A NEGATIVE result should be treated as presumptive. It does not rule out the possibility of COVID-19 and should not be the sole basis for treatment decisions. If COVID-19 is strongly suspected based on clinical and exposure history, re-testing using an alternate molecular assay should be considered.     This test is only for use under the Food and Drug Administration s Emergency Use Authorization (EUA).     Commercial kits are provided by [employer]. Performance characteristics of the EUA have been independently verified by [facility] Department of Pathology and Laboratory Medicine.   _________________________________________________________________   The authorized Fact Sheet for Healthcare Providers and the authorized Fact Sheet for Patients of the ID NOW COVID-19 are available on the FDA website:    https://www.fda.gov/media/135763/download      https://www.fda.gov/media/685981/download      POCT CBC   POCT CMP   POCT TROPONIN   POCT B-TYPE NATRIURETIC PEPTIDE (BNP)   POCT RAPID INFLUENZA A/B   POCT B-TYPE NATRIURETIC PEPTIDE (BNP)     EKG Readings: (Independently Interpreted)   Normal sinus rhythm with a rate of 83 beats per minute, right atrial enlargement, normal axis and intervals, Q-wave in V1 through V2.  No obvious acute ST segment changes.  EKG grossly unchanged when compared to previous EKG from March 2023.     ECG Results              EKG 12-lead (Final result)  Result time 09/20/23 22:56:58      Final result by Interface, Lab In [facility] (09/20/23 22:56:58)                   Narrative:    Test Reason : R06.02,    Vent. Rate : 083 BPM     Atrial Rate : 083 BPM     P-R Int : 156 ms          QRS Dur : 074 ms      QT Int : 342 ms       P-R-T Axes : 082 080 067 degrees     QTc Int : 401 ms    Normal sinus rhythm  Right  atrial enlargement  Septal infarct (cited on or before 20-MAR-2023)  Abnormal ECG  When compared with ECG of 20-MAR-2023 15:52,  No significant change was found  Confirmed by Micheal SHANE, Dylan BRANTLEY ([id number]) on 9/20/2023 10:56:50 PM    Referred By: ZE   SELF           Confirmed By:Dylan Burton MD                                  Imaging Results              X-Ray Chest AP Portable (Final result)  Result time 09/18/23 16:51:21      Final result by Ruben Montes MD (09/18/23 16:51:21)                   Impression:      Hyperexpansion of the lungs suggestive of underlying emphysematous changes.    No acute chest disease identified.      Electronically signed by: Ruben Montes MD  Date:    09/18/2023  Time:    16:51               Narrative:    EXAMINATION:  XR CHEST AP PORTABLE    CLINICAL HISTORY:  CHF;    TECHNIQUE:  Single frontal view of the chest was performed.    COMPARISON:  03/20/2023.    FINDINGS:  The heart is not enlarged.  Atherosclerotic calcification is present within the aortic arch.  Superior mediastinal structures are unremarkable.  Pulmonary vasculature is within normal limits.  The lungs appear hyperexpanded suggestive of underlying emphysematous changes.  There is no evidence for pneumothorax or large pleural effusions.  Bony structures appear intact.                                       Medications   albuterol-ipratropium 2.5 mg-0.5 mg/3 mL nebulizer solution 3 mL (3 mLs Nebulization Given 9/18/23 1750)     Medical Decision Making   MDM  This is an emergent evaluation of a 68 y.o. male with a past medical history of COPD, hypertension who presents with SOB for about 1 year, worsening in last 1.5 weeks. Initial vitals in the ED [09/18/23 1555]  BP: (!) 136/91  Pulse: 81  Resp: 18  Temp: 97.5 °F (36.4 °C)  SpO2: (!) 94 % .     Physical exam noted above. DDx includes but is not limited to COPD exacerbation, CHF, pleural effusion, pneumonia, ACS, COVID, influenza versus other viral illness. Also  considered but clinically less likely to be pulmonary embolism, dissection, pneumothorax. Will obtain labs and imaging including BNP, CBC, CMP, Troponin ISTAT, COVID and Flu test. Will also provide DuoNeb treatment. Will continue to monitor and frequently reassess pending results of labs, treatments and final disposition.    Patient is aware of plan and is amenable.     Yasmine Baer D.O  EMERGENCY MEDICINE  5:42 PM 09/18/2023    UPDATE  Labs unremarkable.  Chest x-ray reveals hyperexpansion of bilateral lung fields.  Findings consistent with emphysematous changes.  EKG shows no acute STEMI.  Patient was treated in the ED with a DuoNeb treatment.  On reassessment he states that his symptoms were improved.  Vitals reassuring including pulse ox 99% room air.  Given history and presentation in the setting of an otherwise benign workup, will discharge with a short course of antibiotics and steroids for COPD exacerbation.  Will also give pulmonology/PCP follow-up in ED return precautions.  Patient aware and agreeable to plan.    Yasmine Baer D.O  EMERGENCY MEDICINE   6:21 PM 09/18/2023       This chart was completed using dictation software, as a result there may be some transcription errors      Amount and/or Complexity of Data Reviewed  Independent Historian: spouse  Labs: ordered. Decision-making details documented in ED Course.  Radiology: ordered and independent interpretation performed. Decision-making details documented in ED Course.  ECG/medicine tests: ordered and independent interpretation performed. Decision-making details documented in ED Course.    Risk  Prescription drug management.            Scribe Attestation:   Scribe #1: I performed the above scribed service and the documentation accurately describes the services I performed. I attest to the accuracy of the note.                      I, Yasmine Baer DO  , personally performed the services described in this documentation.   All medical record entries made by the scribe were at my direction and in my presence.  I have reviewed the chart and agree that the record reflects my personal performance and is accurate and complete.    Clinical Impression:   Final diagnoses:  [R06.02] Shortness of breath  [J44.1] COPD exacerbation (Primary)        ED Disposition Condition    Discharge Stable          ED Prescriptions       Medication Sig Dispense Start Date End Date Auth. Provider    azithromycin (Z-BHAVANA) 250 MG tablet Take 1 tablet (250 mg total) by mouth once daily. Take first 2 tablets together, then 1 every day until finished. 6 tablet 9/18/2023 -- Yasmine Baer DO    predniSONE (DELTASONE) 20 MG tablet Take 2 tablets (40 mg total) by mouth once daily. for 5 days 10 tablet 9/18/2023 9/23/2023 Yasmine Baer DO          Follow-up Information       Follow up With Specialties Details Why Contact Info    Kody Healy MD Internal Medicine Schedule an appointment as soon as possible for a visit in 3 days Emergency Room Follow-up [address] [address]  [phone]      [facility] Emergency Medicine Go to  If symptoms worsen [address] [facility] [address] [phone]    [facility] PULMONARY MEDICINE Pulmonology Schedule an appointment as soon as possible for a visit in 1 week Emergency Room Follow-up [address]  [facility] [address]  [phone]             Yasmine Baer DO  09/22/23 2283

## 2023-09-18 NOTE — DISCHARGE INSTRUCTIONS
Thank you for coming to our Emergency Department today. It is important to remember that some problems or medical conditions are difficult to diagnose and may not be found during your Emergency Department visit.     Be sure to follow up with your primary care doctor and review all labs/imaging/tests that were performed during your ER visit with them. Some labs/tests may be outside of the normal range and require non-emergent follow-up and further investigation to help diagnose/exclude/prevent complications or other potentially serious medical conditions that were not addressed during your ER visit.    If you do not have a primary care doctor, you may contact the one listed on your discharge paperwork or you may also call the [facility] Appointment Desk at [phone] to schedule an appointment and establish care with one. It is important to your health that you have a primary care doctor.    Please take all medications as directed. All medications may potentially have side-effects and it is impossible to predict which medications may give you side-effects or what side-effects (if any) they will give you.. If you feel that you are having a negative effect or side-effect of any medication you should immediately stop taking them and seek medical attention. If you feel that you are having a life-threatening reaction call 911.    Return to the ER with any questions/concerns, new/concerning symptoms, worsening or failure to improve.     Do not drive, swim, climb to height, take a bath, operate heavy machinery, drink alcohol or take potentially sedating medications, sign any legal documents or make any important decisions for 24 hours if you have received any pain medications, sedatives or mood altering drugs during your ER visit or within 24 hours of taking them if they have been prescribed to you.     You can find additional resources for Dentists, hearing aids, durable medical equipment, low cost pharmacies and  other resources at https://[facility].org    BELOW THIS LINE ONLY APPLIES IF YOU HAVE A COVID TEST PENDING OR IF YOU HAVE BEEN DIAGNOSED WITH COVID:  Please access [facility] to review the results of your test. Until the results of your COVID test return, you should isolate yourself so as not to potentially spread illness to others.   If your COVID test returns positive, you should isolate yourself so as not to spread illness to others. After five full days, if you are feeling better and you have not had fever for 24 hours, you can return to your typical daily activities, but you must wear a mask around others for an additional 5 days.   If your COVID test returns negative and you are either unvaccinated or more than six months out from your two-dose vaccine and are not yet boosted, you should still quarantine for 5 full days followed by strict mask use for an additional 5 full days.   If your COVID test returns negative and you have received your 2-dose initial vaccine as well as a booster, you should continue strict mask use for 10 full days after the exposure.  For all those exposed, best practice includes a test at day 5 after the exposure. This can be a home test or a test through one of the many testing centers throughout our community.   Masking is always advised to limit the spread of COVID. Cdc.gov is an excellent site to obtain the latest up to date recommendations regarding COVID and COVID testing.     CDC Testing and Quarantine Guidelines for patients with exposure to a known-positive COVID-19 person:  A close exposure is defined as anyone who has had an exposure (masked or unmasked) to a known COVID -19 positive person within 6 feet of someone for a cumulative total of 15 minutes or more over a 24-hour period.   Vaccinated and/or if you recently had a positive covid test within 90 days do NOT need to quarantine after contact with someone who had COVID-19 unless you develop symptoms.   Fully vaccinated  people who have not had a positive test within 90 days, should get tested 3-5 days after their exposure, even if they don't have symptoms and wear a mask indoors in public for 14 days following exposure or until their test result is negative.      Unvaccinated and/or NOT had a positive test within 90 days and meet close exposure  You are required by CDC guidelines to quarantine for at least 5 days from time of exposure followed by 5 days of strict masking. It is recommended, but not required to test after 5 days, unless you develop symptoms, in which case you should test at that time.  If you get tested after 5 days and your test is positive, your 5 day period of isolation starts the day of the positive test.    If your exposure does not meet the above definition, you can return to your normal daily activities to include social distancing, wearing a mask and frequent handwashing.      Here is a link to guidance from the CDC:  https://www.cdc.gov/media/releases/2021/s1227-isolation-quarantine-guidance.html      [place] Dept Of Health Testing Sites:  https://ldh.la.gov/page/3934      [facility] website with testing locations and guidance:  https://[email or web address].[email or web address]

## 2023-09-18 NOTE — ED NOTES
Patient identifiers verified and correct for Mr. Ayala  C/C: SOB with productive cough  APPEARANCE: awake and alert in NAD.  SKIN: warm, dry and intact. No breakdown or bruising.  MUSCULOSKELETAL: Patient moving all extremities spontaneously, no obvious swelling or deformities noted. Ambulates independently.  RESPIRATORY: shortness of breath with cough.Respirations unlabored.   CARDIAC: Denies CP,  distal pulses; no peripheral edema  ABDOMEN: denies abdominal pain and n/v/d   : voids spontaneously, denies difficulty  Neurologic: AAO x 4; follows commands equal strength in all extremities; denies numbness/tingling. Denies dizziness

## 2023-09-20 DIAGNOSIS — I10 ESSENTIAL HYPERTENSION: ICD-10-CM

## 2023-09-27 ENCOUNTER — HOSPITAL ENCOUNTER (EMERGENCY)
Facility: HOSPITAL | Age: 68
Discharge: HOME | End: 2023-09-27
Attending: EMERGENCY MEDICINE
Payer: MEDICARE

## 2023-09-27 VITALS
SYSTOLIC BLOOD PRESSURE: 149 MMHG | DIASTOLIC BLOOD PRESSURE: 97 MMHG | WEIGHT: 130 LBS | TEMPERATURE: 97 F | OXYGEN SATURATION: 97 % | RESPIRATION RATE: 24 BRPM | HEIGHT: 69 IN | HEART RATE: 90 BPM | BODY MASS INDEX: 19.26 KG/M2

## 2023-09-27 DIAGNOSIS — R06.02 SHORTNESS OF BREATH: ICD-10-CM

## 2023-09-27 DIAGNOSIS — J44.1 COPD EXACERBATION: Primary | ICD-10-CM

## 2023-09-27 DIAGNOSIS — I10 ESSENTIAL HYPERTENSION: ICD-10-CM

## 2023-09-27 LAB
ALBUMIN SERPL-MCNC: 4 G/DL (ref 3.3–5.5)
ALP SERPL-CCNC: 64 U/L (ref 42–141)
BILIRUB SERPL-MCNC: 0.8 MG/DL (ref 0.2–1.6)
BUN SERPL-MCNC: 14 MG/DL (ref 7–22)
CALCIUM SERPL-MCNC: 9.4 MG/DL (ref 8–10.3)
CHLORIDE SERPL-SCNC: 100 MMOL/L (ref 98–108)
CREAT SERPL-MCNC: 1.1 MG/DL (ref 0.6–1.2)
GLUCOSE SERPL-MCNC: 109 MG/DL (ref 73–118)
INFLUENZA A ANTIGEN, POC: NEGATIVE
INFLUENZA B ANTIGEN, POC: NEGATIVE
POC ALT (SGPT): 23 U/L (ref 10–47)
POC AST (SGOT): 25 U/L (ref 11–38)
POC B-TYPE NATRIURETIC PEPTIDE: 19.1 PG/ML (ref 0–100)
POC CARDIAC TROPONIN I: 0.01 NG/ML (ref 0–0.08)
POC TCO2: 31 MMOL/L (ref 18–33)
POTASSIUM BLD-SCNC: 4.3 MMOL/L (ref 3.6–5.1)
PROTEIN, POC: 7.3 G/DL (ref 6.4–8.1)
SAMPLE: NORMAL
SODIUM BLD-SCNC: 140 MMOL/L (ref 128–145)

## 2023-09-27 PROCEDURE — 94640 AIRWAY INHALATION TREATMENT: CPT | Mod: ER

## 2023-09-27 PROCEDURE — 80053 COMPREHEN METABOLIC PANEL: CPT | Mod: ER

## 2023-09-27 PROCEDURE — 83880 ASSAY OF NATRIURETIC PEPTIDE: CPT | Mod: ER

## 2023-09-27 PROCEDURE — 93005 ELECTROCARDIOGRAM TRACING: CPT | Mod: ER

## 2023-09-27 PROCEDURE — 87804 INFLUENZA ASSAY W/OPTIC: CPT | Mod: ER

## 2023-09-27 PROCEDURE — 84484 ASSAY OF TROPONIN QUANT: CPT | Mod: ER

## 2023-09-27 PROCEDURE — 93010 EKG 12-LEAD: ICD-10-PCS | Mod: ,,, | Performed by: INTERNAL MEDICINE

## 2023-09-27 PROCEDURE — 27100098 HC SPACER: Mod: ER

## 2023-09-27 PROCEDURE — 99284 EMERGENCY DEPT VISIT MOD MDM: CPT | Mod: 25,ER

## 2023-09-27 PROCEDURE — 25000242 PHARM REV CODE 250 ALT 637 W/ HCPCS: Mod: ER | Performed by: NURSE PRACTITIONER

## 2023-09-27 PROCEDURE — 93010 ELECTROCARDIOGRAM REPORT: CPT | Mod: ,,, | Performed by: INTERNAL MEDICINE

## 2023-09-27 RX ORDER — ALBUTEROL SULFATE 90 UG/1
1-2 AEROSOL, METERED RESPIRATORY (INHALATION) EVERY 6 HOURS PRN
Qty: 8 G | Refills: 0 | Status: SHIPPED | OUTPATIENT
Start: 2023-09-27

## 2023-09-27 RX ORDER — IPRATROPIUM BROMIDE AND ALBUTEROL SULFATE 2.5; .5 MG/3ML; MG/3ML
3 SOLUTION RESPIRATORY (INHALATION) EVERY 6 HOURS PRN
Qty: 30 ML | Refills: 0 | Status: SHIPPED | OUTPATIENT
Start: 2023-09-27 | End: 2023-10-06 | Stop reason: SDUPTHER

## 2023-09-27 RX ORDER — IPRATROPIUM BROMIDE AND ALBUTEROL SULFATE 2.5; .5 MG/3ML; MG/3ML
3 SOLUTION RESPIRATORY (INHALATION)
Status: COMPLETED | OUTPATIENT
Start: 2023-09-27 | End: 2023-09-27

## 2023-09-27 RX ORDER — GUAIFENESIN 600 MG/1
1200 TABLET, EXTENDED RELEASE ORAL 2 TIMES DAILY PRN
Qty: 40 TABLET | Refills: 0 | Status: SHIPPED | OUTPATIENT
Start: 2023-09-27 | End: 2023-10-07

## 2023-09-27 RX ADMIN — IPRATROPIUM BROMIDE AND ALBUTEROL SULFATE 3 ML: .5; 3 SOLUTION RESPIRATORY (INHALATION) at 08:09

## 2023-09-27 RX ADMIN — IPRATROPIUM BROMIDE AND ALBUTEROL SULFATE 3 ML: .5; 3 SOLUTION RESPIRATORY (INHALATION) at 09:09

## 2023-09-27 NOTE — ED PROVIDER NOTES
"Encounter Date: 9/27/2023    SCRIBE #1 NOTE: I, Cassie Mckeon, am scribing for, and in the presence of,  Margot Pérez NP. I have scribed the following portions of the note - Other sections scribed: HPI, ROS.       History     Chief Complaint   Patient presents with    COPD     Pt presents w/ a c/o of COPD excerebration for approximately a week. Denies any CP, however endorses pain when coughing. Was here on 9/18 and prescribed prednisone and steroids. Finished medication w/ relief, but symptoms started to return shortly after. GCS 15. VSS. 94% on RA.     Dieter Ayala is a 68 y.o. male, with a PMHx of COPD, hypertension, tobacco dependence who presents to the ED with shortness of breath for "a few days". Patient reports he was seen at this facility on 9/18/2023, was prescribed prednisone and azithromycin with for a few days. He states he notices symptoms are exacerbated with stress. 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 Has appointment with PCP in a few weeks. No other exacerbating or alleviating factors. Denies fever, chills, nausea, vomiting, diarrhea or other associated symptoms.       The history is provided by the patient. No  was used.     Review of patient's allergies indicates:   Allergen Reactions    Sulfa (sulfonamide antibiotics) Hives     Past Medical History:   Diagnosis Date    COPD (chronic obstructive pulmonary disease)     Hypertension     Tobacco dependence 03/18/2016     Past Surgical History:   Procedure Laterality Date    ADENOIDECTOMY      APPENDECTOMY      TONSILLECTOMY       Family History   Problem Relation Age of Onset    Heart disease Mother     ALS Brother     No Known Problems Father     No Known Problems Sister     No Known Problems Maternal Aunt     No Known Problems Maternal Uncle     No Known Problems Paternal Aunt     No Known Problems Paternal Uncle     No Known Problems Maternal Grandmother     No Known Problems " Maternal Grandfather     No Known Problems Paternal Grandmother     No Known Problems Paternal Grandfather      Social History     Tobacco Use    Smoking status: Every Day     Current packs/day: 1.00     Average packs/day: 1 pack/day for 50.0 years (50.0 ttl pk-yrs)     Types: Cigarettes     Passive exposure: Past    Smokeless tobacco: Never   Substance Use Topics    Alcohol use: Yes     Review of Systems   Constitutional:  Negative for chills and fever.   HENT:  Negative for congestion and rhinorrhea.    Respiratory:  Positive for cough and shortness of breath.    Cardiovascular:  Positive for chest pain. Negative for leg swelling.   Gastrointestinal:  Negative for diarrhea, nausea and vomiting.   Genitourinary:  Negative for dysuria and hematuria.   All other systems reviewed and are negative.      Physical Exam     Initial Vitals [09/27/23 0827]   BP Pulse Resp Temp SpO2   (!) 149/97 87 (!) 24 97 °F (36.1 °C) 97 %      MAP       --         Physical Exam    Vitals reviewed.  Constitutional: He appears well-developed and well-nourished. He is not diaphoretic.  Non-toxic appearance. He does not have a sickly appearance. He does not appear ill. No distress.   HENT:   Head: Normocephalic and atraumatic.   Right Ear: External ear normal.   Left Ear: External ear normal.   Neck:   Normal range of motion.  Cardiovascular:  Normal rate, regular rhythm, normal heart sounds and intact distal pulses.           Pulmonary/Chest: No respiratory distress. He has wheezes.   Musculoskeletal:         General: Normal range of motion.      Cervical back: Normal range of motion.      Comments: Extremities without swelling.     Neurological: He is alert and oriented to person, place, and time. GCS eye subscore is 4. GCS verbal subscore is 5. GCS motor subscore is 6.   Skin: Skin is warm, dry and intact. No rash noted. No erythema.   Psychiatric: He has a normal mood and affect. Thought content normal.         ED Course  "  Procedures  Labs Reviewed   TROPONIN ISTAT   POCT CBC   POCT INFLUENZA A/B MOLECULAR   SARS-COV-2 RDRP GENE   POCT CMP   POCT TROPONIN   POCT B-TYPE NATRIURETIC PEPTIDE (BNP)   POCT CMP   POCT B-TYPE NATRIURETIC PEPTIDE (BNP)   POCT RAPID INFLUENZA A/B     EKG Readings: (Independently Interpreted)   Initial Reading: No STEMI. Previous EKG: Compared with most recent EKG Previous EKG Date: 9/18/23. Rhythm: Normal Sinus Rhythm. Heart Rate: 79. Ectopy: No Ectopy. Conduction: Normal.   EKG with normal sinus rhythm with a ventricular rate of 79 beats per minute.  No STEMI.  EKG interpreted by Dr. Muñoz.        Imaging Results              X-Ray Chest PA And Lateral (Final result)  Result time 09/27/23 08:28:09      Final result by Jose F Brown MD (09/27/23 08:28:09)                   Impression:      Findings suggestive of significant pulmonary emphysema/COPD.  No confluent area of consolidation.  Suggest outpatient CT chest lung cancer screening follow-up as clinically appropriate if the patient meets risk factor criteria.      Electronically signed by: Jose F Brown MD  Date:    09/27/2023  Time:    08:28               Narrative:    EXAMINATION:  XR CHEST PA AND LATERAL    CLINICAL HISTORY:  Provided history is "  Shortness of breath".    TECHNIQUE:  Frontal and lateral views of the chest were performed.    COMPARISON:  09/18/2023 and 03/20/2023.    FINDINGS:  Lungs are hyperinflated and there is severe pulmonary emphysema.  Coarsened perihilar interstitial lung markings but no confluent area of consolidation.  Heart is not enlarged.  Atherosclerotic calcifications overlie the aortic arch.  Symmetric nodular densities overlie the lung bases, presumably nipple shadows.  No sizable pleural effusion.  No pneumothorax.                                       Medications   albuterol-ipratropium 2.5 mg-0.5 mg/3 mL nebulizer solution 3 mL (3 mLs Nebulization Given 9/27/23 0841)   albuterol-ipratropium 2.5 mg-0.5 mg/3 mL " nebulizer solution 3 mL (3 mLs Nebulization Given 9/27/23 0946)     Medical Decision Making  68-year-old male with COPD presenting to the ED with shortness of breath, wheeze, cough.  Continues to smoke 1 pack cigarettes daily.  Recently completed short course steroids and Z-Hugo with some temporary improvement in symptoms.  He has been unable to follow up with the PCP or pulmonology.  Today, he is pleasant and well-appearing.  Vital signs are stable and reassuring.  No hypoxia or respiratory distress.  Wheeze noted.  Extremities without swelling.  EKG with normal sinus rhythm without acute ischemia.  Troponin negative.  BNP negative.  Does not appear fluid overloaded on exam.  Do not suspect ACS or acute CHF.  Chest x-ray without consolidation concerning for pneumonia.  Given DuoNebs in the ED with significant improvement in symptoms.  Patient is requesting a prescription for a nebulizer machine for home use.  I will also prescribe DuoNeb solution, refill albuterol inhaler, and prescribed Mucinex.  I have encouraged follow up with PCP and pulmonology for further evaluation and management.  Discussed chest x-ray results and recommendations with the patient who verbalized understanding.  He is agreeable with the plan of care.    Amount and/or Complexity of Data Reviewed  Labs: ordered.  Radiology: ordered.    Risk  OTC drugs.  Prescription drug management.            Scribe Attestation:   Scribe #1: I performed the above scribed service and the documentation accurately describes the services I performed. I attest to the accuracy of the note.                      IALEXANDER, personally performed the services described in this documentation. All medical record entries made by the scribe were at my direction and in my presence.  I have reviewed the chart and agree that the record reflects my personal performance and is accurate and complete.    Clinical Impression:   Final diagnoses:  [R06.02] Shortness of  breath  [J44.1] COPD exacerbation (Primary)        ED Disposition Condition    Discharge Stable          ED Prescriptions       Medication Sig Dispense Start Date End Date Auth. Provider    guaiFENesin (MUCINEX) 600 mg 12 hr tablet Take 2 tablets (1,200 mg total) by mouth 2 (two) times daily as needed for Congestion. 40 tablet 9/27/2023 10/7/2023 Margot Pérez NP    albuterol (PROVENTIL/VENTOLIN HFA) 90 mcg/actuation inhaler Inhale 1-2 puffs into the lungs every 6 (six) hours as needed for Wheezing or Shortness of Breath. Rescue 8 g 9/27/2023 -- Margot Pérez NP    albuterol-ipratropium (DUO-NEB) 2.5 mg-0.5 mg/3 mL nebulizer solution Take 3 mLs by nebulization every 6 (six) hours as needed for Wheezing or Shortness of Breath. Rescue 30 mL 9/27/2023 9/26/2024 Margot Pérez NP          Follow-up Information       Follow up With Specialties Details Why Contact Info    Kody Healy MD Internal Medicine Schedule an appointment as soon as possible for a visit  For follow-up [address] [facility] [address] [phone]      Deep Yancey MD Pulmonary Disease, Sleep Medicine Schedule an appointment as soon as possible for a visit  For follow-up [address] [facility] [address]  [address]  [facility] [address]  [phone]      [facility] ED Emergency Medicine Go to  If symptoms worsen [address] [facility] [address] [phone]             Margot Pérez NP  09/27/23 3979

## 2023-09-27 NOTE — DISCHARGE INSTRUCTIONS
Thank you for coming to our Emergency Department today. It is important to remember that some problems or medical conditions are difficult to diagnose and may not be found during your Emergency Department visit.     Be sure to follow up with your primary care doctor and review all labs/imaging/tests that were performed during your ER visit with them. Some labs/tests may be outside of the normal range and require non-emergent follow-up and further investigation to help diagnose/exclude/prevent complications or other potentially serious medical conditions that were not addressed during your ER visit.    If you do not have a primary care doctor, you may contact the one listed on your discharge paperwork or you may also call the [facility] Appointment Desk at [phone] to schedule an appointment and establish care with one. It is important to your health that you have a primary care doctor.    Please take all medications as directed. All medications may potentially have side-effects and it is impossible to predict which medications may give you side-effects or what side-effects (if any) they will give you.. If you feel that you are having a negative effect or side-effect of any medication you should immediately stop taking them and seek medical attention. If you feel that you are having a life-threatening reaction call 911.    Return to the ER with any questions/concerns, new/concerning symptoms, worsening or failure to improve.     Do not drive, swim, climb to height, take a bath, operate heavy machinery, drink alcohol or take potentially sedating medications, sign any legal documents or make any important decisions for 24 hours if you have received any pain medications, sedatives or mood altering drugs during your ER visit or within 24 hours of taking them if they have been prescribed to you.     You can find additional resources for Dentists, hearing aids, durable medical equipment, low cost pharmacies and  other resources at https://[facility].org    BELOW THIS LINE ONLY APPLIES IF YOU HAVE A COVID TEST PENDING OR IF YOU HAVE BEEN DIAGNOSED WITH COVID:  Please access [facility] to review the results of your test. Until the results of your COVID test return, you should isolate yourself so as not to potentially spread illness to others.   If your COVID test returns positive, you should isolate yourself so as not to spread illness to others. After five full days, if you are feeling better and you have not had fever for 24 hours, you can return to your typical daily activities, but you must wear a mask around others for an additional 5 days.   If your COVID test returns negative and you are either unvaccinated or more than six months out from your two-dose vaccine and are not yet boosted, you should still quarantine for 5 full days followed by strict mask use for an additional 5 full days.   If your COVID test returns negative and you have received your 2-dose initial vaccine as well as a booster, you should continue strict mask use for 10 full days after the exposure.  For all those exposed, best practice includes a test at day 5 after the exposure. This can be a home test or a test through one of the many testing centers throughout our community.   Masking is always advised to limit the spread of COVID. Cdc.gov is an excellent site to obtain the latest up to date recommendations regarding COVID and COVID testing.     CDC Testing and Quarantine Guidelines for patients with exposure to a known-positive COVID-19 person:  A close exposure is defined as anyone who has had an exposure (masked or unmasked) to a known COVID -19 positive person within 6 feet of someone for a cumulative total of 15 minutes or more over a 24-hour period.   Vaccinated and/or if you recently had a positive covid test within 90 days do NOT need to quarantine after contact with someone who had COVID-19 unless you develop symptoms.   Fully vaccinated  people who have not had a positive test within 90 days, should get tested 3-5 days after their exposure, even if they don't have symptoms and wear a mask indoors in public for 14 days following exposure or until their test result is negative.      Unvaccinated and/or NOT had a positive test within 90 days and meet close exposure  You are required by CDC guidelines to quarantine for at least 5 days from time of exposure followed by 5 days of strict masking. It is recommended, but not required to test after 5 days, unless you develop symptoms, in which case you should test at that time.  If you get tested after 5 days and your test is positive, your 5 day period of isolation starts the day of the positive test.    If your exposure does not meet the above definition, you can return to your normal daily activities to include social distancing, wearing a mask and frequent handwashing.      Here is a link to guidance from the CDC:  https://www.cdc.gov/media/releases/2021/s1227-isolation-quarantine-guidance.html      [place] Dept Of Health Testing Sites:  https://ldh.la.gov/page/3934      [facility] website with testing locations and guidance:  [email or web address]

## 2023-09-29 ENCOUNTER — TELEPHONE (OUTPATIENT)
Dept: FAMILY MEDICINE | Facility: CLINIC | Age: 68
End: 2023-09-29
Payer: MEDICARE

## 2023-09-29 NOTE — TELEPHONE ENCOUNTER
----- Message from Madhavi Cardona sent at 9/28/2023 11:49 AM CDT -----  Regarding: self  .[phone] or [phone]  .Type: Patient Call Back    Who called: self     What is the request in detail: Requesting to get a rx for a nebulizer and solution. Pt is requesting to get it today due [place]    Can the clinic reply by [facility]? Call back     Would the patient rather a call back or a response via [facility]?  Call back     Best call back number: .[phone] or [phone]

## 2023-10-02 ENCOUNTER — TELEPHONE (OUTPATIENT)
Dept: ENDOSCOPY | Facility: HOSPITAL | Age: 68
End: 2023-10-02
Payer: MEDICARE

## 2023-10-02 NOTE — TELEPHONE ENCOUNTER
No show for procedure today, spoke to spouse, states they forgot to call to cancel and the he has been having too much trouble with his COPD to go thru the procedure.

## 2023-10-02 NOTE — TELEPHONE ENCOUNTER
This patient was a no show for his Colonoscopy today. His spouse states he has been having trouble with his COPD and cannot get in touch with his PCP.

## 2023-10-04 DIAGNOSIS — I10 ESSENTIAL HYPERTENSION: ICD-10-CM

## 2023-10-06 ENCOUNTER — TELEPHONE (OUTPATIENT)
Dept: FAMILY MEDICINE | Facility: CLINIC | Age: 68
End: 2023-10-06

## 2023-10-06 ENCOUNTER — OFFICE VISIT (OUTPATIENT)
Dept: FAMILY MEDICINE | Facility: CLINIC | Age: 68
End: 2023-10-06
Payer: MEDICARE

## 2023-10-06 VITALS
WEIGHT: 122.13 LBS | BODY MASS INDEX: 18.09 KG/M2 | DIASTOLIC BLOOD PRESSURE: 80 MMHG | TEMPERATURE: 98 F | HEART RATE: 97 BPM | HEIGHT: 69 IN | OXYGEN SATURATION: 93 % | SYSTOLIC BLOOD PRESSURE: 120 MMHG

## 2023-10-06 DIAGNOSIS — J43.9 PULMONARY EMPHYSEMA, UNSPECIFIED EMPHYSEMA TYPE: ICD-10-CM

## 2023-10-06 DIAGNOSIS — R19.5 POSITIVE COLORECTAL CANCER SCREENING USING COLOGUARD TEST: ICD-10-CM

## 2023-10-06 DIAGNOSIS — D75.1 POLYCYTHEMIA: ICD-10-CM

## 2023-10-06 DIAGNOSIS — J44.1 COPD EXACERBATION: Primary | ICD-10-CM

## 2023-10-06 DIAGNOSIS — I10 ESSENTIAL HYPERTENSION: ICD-10-CM

## 2023-10-06 DIAGNOSIS — F17.200 TOBACCO DEPENDENCE: ICD-10-CM

## 2023-10-06 PROCEDURE — 99215 PR OFFICE/OUTPT VISIT, EST, LEVL V, 40-54 MIN: ICD-10-PCS | Mod: S$PBB,,, | Performed by: INTERNAL MEDICINE

## 2023-10-06 PROCEDURE — 99213 OFFICE O/P EST LOW 20 MIN: CPT | Mod: PBBFAC,PO | Performed by: INTERNAL MEDICINE

## 2023-10-06 PROCEDURE — 99999 PR PBB SHADOW E&M-EST. PATIENT-LVL III: CPT | Mod: PBBFAC,,, | Performed by: INTERNAL MEDICINE

## 2023-10-06 PROCEDURE — 99215 OFFICE O/P EST HI 40 MIN: CPT | Mod: S$PBB,,, | Performed by: INTERNAL MEDICINE

## 2023-10-06 PROCEDURE — 99999 PR PBB SHADOW E&M-EST. PATIENT-LVL III: ICD-10-PCS | Mod: PBBFAC,,, | Performed by: INTERNAL MEDICINE

## 2023-10-06 RX ORDER — IPRATROPIUM BROMIDE AND ALBUTEROL SULFATE 2.5; .5 MG/3ML; MG/3ML
3 SOLUTION RESPIRATORY (INHALATION) EVERY 6 HOURS PRN
Qty: 500 ML | Refills: 12 | Status: SHIPPED | OUTPATIENT
Start: 2023-10-06

## 2023-10-06 RX ORDER — FLUTICASONE PROPIONATE AND SALMETEROL 250; 50 UG/1; UG/1
1 POWDER RESPIRATORY (INHALATION) 2 TIMES DAILY
Qty: 60 EACH | Refills: 12 | Status: SHIPPED | OUTPATIENT
Start: 2023-10-06 | End: 2024-10-05

## 2023-10-06 RX ORDER — POLYETHYLENE GLYCOL-3350 AND ELECTROLYTES WITH FLAVOR PACK 240; 5.84; 2.98; 6.72; 22.72 G/278.26G; G/278.26G; G/278.26G; G/278.26G; G/278.26G
POWDER, FOR SOLUTION ORAL
COMMUNITY
Start: 2023-07-21

## 2023-10-06 RX ORDER — ALBUTEROL SULFATE 0.63 MG/3ML
0.63 SOLUTION RESPIRATORY (INHALATION) EVERY 6 HOURS PRN
Qty: 300 EACH | Refills: 12 | Status: SHIPPED | OUTPATIENT
Start: 2023-10-06

## 2023-10-06 RX ORDER — FLUTICASONE FUROATE, UMECLIDINIUM BROMIDE AND VILANTEROL TRIFENATATE 200; 62.5; 25 UG/1; UG/1; UG/1
1 POWDER RESPIRATORY (INHALATION) DAILY
Qty: 90 EACH | Refills: 12 | Status: SHIPPED | OUTPATIENT
Start: 2023-10-06

## 2023-10-06 NOTE — PROGRESS NOTES
"Chief complaint:   Follow-up from the ER    Patient is a 68-year-old white male here with his female partner new 8/22. Last seen post ER 4/23.   Both of them really do not go see the doctor.  Reviewed recent COPD exacerbation.  Looks like they gave him some  DuoNeb in he needs a refill of that.  Also discussed probable underlying COPD in the benefits of inhaled steroids and inhaled bronchodilators so will try to prescribe Trelegy which does appear to be preferred on his insurance but does not have part D now. .  We did look up Wixela on the good Rx plan was about 98 dollars and they might be able to afford that currently.  We discussed acute treatment with albuterol and the DuoNeb may not be covered.  We discussed issues regarding Medicare coverage of the albuterol and the nebulizer and gave them printed prescriptions for the nebulizer.  He already has the tubing here with him in the office.  We discussed doing PFTs when his COPD exacerbation improves and he gets back to baseline.  We discussed emphysema.  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    9/27 ER -------Dieter Ayala is a 68 y.o. male, with a PMHx of COPD, hypertension, tobacco dependence who presents to the ED with shortness of breath for "a few days". Patient reports he was seen at this facility on 9/18/2023, was prescribed prednisone and azithromycin with for a few days. He states he notices symptoms are exacerbated with stress. 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 Has appointment with PCP in a few weeks  CXR -Lungs are hyperinflated and there is severe pulmonary emphysema.  Coarsened perihilar interstitial lung markings but no confluent area of consolidation.  Heart is not enlarged.  Atherosclerotic calcifications overlie the aortic arch.  Symmetric nodular densities " overlie the lung bases, presumably nipple shadows.  No sizable pleural effusion.  No pneumothorax.   Given DuoNebs in the ED with significant improvement in symptoms.  Patient is requesting a prescription for a nebulizer machine for home use.  I will also prescribe DuoNeb solution, refill albuterol inhaler, and prescribed Mucinex.  I have encouraged follow up with PCP and pulmonology for further evaluation and management.    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  He lot of coughing and his blood pressure was high offshore but normal by the time he got to the ER.  He was sent in from the offshore rig.  He was not given an albuterol treatment or any antibiotics.  He was given prednisone and a steroid shot.  His wife gave him some leftover antibiotics from a tooth and he did get better over about two week period of time and discussed he may well have been getting better on his own and antibiotic probably did not give him any benefit.  He does have some occasional coughing when he breathes or talks which likely suggest allergies and they have other allergy symptoms we discussed using a Claritin every day.  He did try the albuterol inhaler given any felt funny and we did discuss that it can increase heart rate and make you shaky.  He was holding his breath 10 seconds in between each puff which also likely made him lightheaded.      We discussed COPD at length as well as the extensive emphysema seen on the CT and that is irreversible.  There could be a reversible component if he has congestion and wheezing which could be smoker's cough, allergies and or COPD.  I encouraged him to use the inhaler to see if it allows him to do more with less shortness of breath but otherwise he really did not have any respiratory compromise prior.  We discussed maintenance medication with inhaled steroid should he find benefit from the albuterol and need to use the albuterol fairly often.      He is he has noticed some bruising on his forearms only.  We discussed is probably normal in age related due to minor trauma.  No other bruising or bleeding.  No other known history of any liver disease.    Lab work was all normal.    Regarding health maintenance   Never had any colon screening is open to doing coloGuard but not colonoscopy- test POS 9/22.    Discussed the possibility of polyps and or colon cancer and the significant need for colonoscopy and after that long discussion they are willing to and were encouraged to follow through when they get a call to schedule.    4 calls by scheduling for colon  Called patient to schedule for a colonoscopy. No answer. Left message for patient to call Endoscopy Scheduling. Phone number provided.    Discussed need to get a PSA every year.  He has had decreased appetite over the last couple of years and lost about 45 lb over two years.  No early satiety or dysphagia.  He does not have symptoms of depression.  No symptoms of thyroid dysfunction or tachycardia.  He has a brother with AL S.  He does smoke one pack per day.   His dyspnea on exertion is stable.  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  He does have expiratory wheezing counseling and probably has underlying COPD.        ROS:   CONST: weight stable but lost weight over two years.. EYES: no vision change. ENT: no sore throat. CV: no chest pain w/ exertion. RESP: no shortness of breath that is new.. GI: no nausea, vomiting, diarrhea. No dysphagia. : no urinary issues. MUSCULOSKELETAL: no new myalgias or arthralgias. SKIN: no new changes. NEURO: no focal deficits. PSYCH: no new issues. ENDOCRINE: no polyuria. HEME: no lymph nodes. ALLERGY: no general pruritis.      Past Medical History:   Diagnosis Date    COPD (chronic obstructive pulmonary disease)     Hypertension     Tobacco dependence 03/18/2016   Pos cologuard 9/22    Past Surgical History:    Procedure Laterality Date    ADENOIDECTOMY      APPENDECTOMY      TONSILLECTOMY       Social History     Socioeconomic History    Marital status:    Tobacco Use    Smoking status: Every Day     Current packs/day: 1.00     Average packs/day: 1 pack/day for 50.0 years (50.0 ttl pk-yrs)     Types: Cigarettes     Passive exposure: Past    Smokeless tobacco: Never   Substance and Sexual Activity    Alcohol use: Yes     family history includes ALS in his brother; Heart disease in his mother; No Known Problems in his father, maternal aunt, maternal grandfather, maternal grandmother, maternal uncle, paternal aunt, paternal grandfather, paternal grandmother, paternal uncle, and sister.  Sister had hepatitis B and apparently mom had some hepatitis B and so he has antibodies    Gen: no distress  EYES: conjunctiva clear, non-icteric, PERRL  ENT: nose clear, nasal mucosa normal, oropharynx clear and moist, teeth poor  NECK:supple, thyroid non-palpable  RESP: effort is good, lungs and expiratory wheezing throughout  CV: heart RRR w/o murmur, gallops or rubs; no carotid bruits, no edema  GI: abdomen soft, non-distended, non-tender,   MS: gait normal, no clubbing or cyanosis of the digits  SKIN: no rashes, warm to touch, black seborrheic keratosis on the right temple, senile purpura and ecchymosis on the forearms only       Over 70 min  minutes of total time spent on the encounter, which includes face to face time and non-face to face time preparing to see the patient (eg, review of tests), Obtaining and/or reviewing separately obtained history, Documenting clinical information in the electronic or other health record, Independently interpreting results (not separately reported) and communicating results to the patient/family/caregiver, or Care coordination (not separately reported).    Prior labs reviewed     Diagnoses and all orders for this visit:    COPD exacerbation, reassured patient he has received steroids and that  the Z-Hugo although five days of pills was 10 days of antibiotics and I do not think he needs any additional.  It does take time for the COPD to calm down.  He also needs maintenance medications with an inhaled steroid and bronchodilator.  Trelegy would be great but is just not cost effective right now so will try Wixela, set him up for PFTs, eventual pulmonary referral.  -     NEBULIZER FOR HOME USE    Pulmonary emphysema, unspecified emphysema type  -     Complete PFT w/ bronchodilator; Future    Positive colorectal cancer screening using Cologuard test, losing weight which could be COPD and emphysema but have to consider underlying colon cancer and we have tried repeatedly to set up colonoscopy.  Most recent COPD exacerbation apparently got in the way and will put in another referral urgently.  Insurance change should be all straightened out and they should have an advantage plan by the time things are scheduled  -     Ambulatory referral/consult to Endo Procedure ; Future    Essential hypertension, chronic and stable    Tobacco dependence    Polycythemia, obviously secondary most likely    Other orders  -     albuterol-ipratropium (DUO-NEB) 2.5 mg-0.5 mg/3 mL nebulizer solution; Take 3 mLs by nebulization every 6 (six) hours as needed for Wheezing or Shortness of Breath. Rescue  -     fluticasone-umeclidin-vilanter (TRELEGY ELLIPTA) 200-62.5-25 mcg inhaler; Inhale 1 puff into the lungs once daily.  -     albuterol (ACCUNEB) 0.63 mg/3 mL Nebu; Take 3 mLs (0.63 mg total) by nebulization every 6 (six) hours as needed. Rescue  -     fluticasone-salmeterol diskus inhaler 250-50 mcg; Inhale 1 puff into the lungs 2 (two) times daily. Controller

## 2023-10-06 NOTE — TELEPHONE ENCOUNTER
----- Message from Charles Elizabeth sent at 10/6/2023  2:15 PM CDT -----  Regarding: [facility] [phone]   Type: Pharmacy Calling to Clarify an RX    [facility] of Caller:  [facility]     [facility] Name[facility]     Prescription Name: albuterol (ACCUNEB) 0.63 mg/3 mL Nebu    What do they need to clarify? ICD 10 dx code in order to bill medicare     Can you be contacted via [facility]? Call back     Best Call Back Number:  [phone]    Additional Information:

## 2023-10-09 ENCOUNTER — TELEPHONE (OUTPATIENT)
Dept: ENDOSCOPY | Facility: HOSPITAL | Age: 68
End: 2023-10-09

## 2023-10-09 NOTE — TELEPHONE ENCOUNTER
MA spoke with pt   Pt declined to set up colonoscopy   Pt states he will call back when he's ready

## 2023-10-10 ENCOUNTER — TELEPHONE (OUTPATIENT)
Dept: FAMILY MEDICINE | Facility: CLINIC | Age: 68
End: 2023-10-10

## 2023-10-10 NOTE — TELEPHONE ENCOUNTER
----- Message from Eneida Mullen MA sent at 10/9/2023  3:11 PM CDT -----  Type: Patient Call Back    Who called: Spouse - Ms. Ayala    What is the request in detail: states she was told by the pharmacy the nurse or provider has to call this medication in directly..     albuterol-ipratropium (DUO-NEB) 2.5 mg-0.5 mg/3 mL nebulizer solution    Can the clinic reply by [facility]?No    Would the patient rather a call back or a response via [facility]? Yes, call     Best call back number :[phone] (home)

## 2023-10-10 NOTE — TELEPHONE ENCOUNTER
Pts wife informed that nebulizer machine comes from [facility]. Not pharmacy. Pts wife was given number to call for more info on machine.

## 2024-01-22 ENCOUNTER — OFFICE VISIT (OUTPATIENT)
Dept: URGENT CARE | Facility: CLINIC | Age: 69
End: 2024-01-22
Payer: MEDICARE

## 2024-01-22 VITALS
BODY MASS INDEX: 18.07 KG/M2 | RESPIRATION RATE: 18 BRPM | WEIGHT: 122 LBS | DIASTOLIC BLOOD PRESSURE: 91 MMHG | TEMPERATURE: 98 F | HEIGHT: 69 IN | HEART RATE: 96 BPM | OXYGEN SATURATION: 95 % | SYSTOLIC BLOOD PRESSURE: 132 MMHG

## 2024-01-22 DIAGNOSIS — J44.1 COPD EXACERBATION: Primary | ICD-10-CM

## 2024-01-22 PROCEDURE — 99213 OFFICE O/P EST LOW 20 MIN: CPT | Mod: 25,S$GLB,,

## 2024-01-22 PROCEDURE — 71046 X-RAY EXAM CHEST 2 VIEWS: CPT | Mod: S$GLB,,, | Performed by: RADIOLOGY

## 2024-01-22 PROCEDURE — 94640 AIRWAY INHALATION TREATMENT: CPT | Mod: S$GLB,,,

## 2024-01-22 RX ORDER — IPRATROPIUM BROMIDE 0.5 MG/2.5ML
0.5 SOLUTION RESPIRATORY (INHALATION)
Status: COMPLETED | OUTPATIENT
Start: 2024-01-22 | End: 2024-01-22

## 2024-01-22 RX ORDER — PREDNISONE 20 MG/1
40 TABLET ORAL DAILY
Qty: 10 TABLET | Refills: 0 | Status: SHIPPED | OUTPATIENT
Start: 2024-01-22 | End: 2024-01-27

## 2024-01-22 RX ORDER — ALBUTEROL SULFATE 0.83 MG/ML
2.5 SOLUTION RESPIRATORY (INHALATION)
Status: COMPLETED | OUTPATIENT
Start: 2024-01-22 | End: 2024-01-22

## 2024-01-22 RX ORDER — AZITHROMYCIN 250 MG/1
TABLET, FILM COATED ORAL
Qty: 6 TABLET | Refills: 0 | Status: SHIPPED | OUTPATIENT
Start: 2024-01-22 | End: 2024-01-27

## 2024-01-22 RX ADMIN — IPRATROPIUM BROMIDE 0.5 MG: 0.5 SOLUTION RESPIRATORY (INHALATION) at 01:01

## 2024-01-22 RX ADMIN — ALBUTEROL SULFATE 2.5 MG: 0.83 SOLUTION RESPIRATORY (INHALATION) at 01:01

## 2024-01-22 NOTE — PROGRESS NOTES
"Subjective:      Patient ID: Dieter Ayala is a 68 y.o. male.    Vitals:  height is 5' 9" (1.753 m) and weight is 55.3 kg (122 lb). His oral temperature is 98 °F (36.7 °C). His blood pressure is 132/91 (abnormal) and his pulse is 96. His respiration is 18 and oxygen saturation is 95%.     Chief Complaint: Shortness of Breath    Patient is a 68-year-old male with history of COPD presenting with productive coughing and shortness of breath for the past 10 days.  He had a negative home COVID test.  Has been using his inhaler and nebulizer with minimal relief.  Denies fever, chills, headaches, body aches, chest pain, leg swelling or pain, nausea, vomiting, abdominal pain.    Shortness of Breath  This is a new problem. The current episode started yesterday. The problem occurs constantly. The problem has been unchanged. Pertinent negatives include no abdominal pain, chest pain, ear pain, fever, headaches, neck pain, rash, sore throat or vomiting.       Constitution: Negative for chills and fever.   HENT:  Negative for ear pain, congestion and sore throat.    Neck: Negative for neck pain.   Cardiovascular:  Negative for chest pain.   Respiratory:  Positive for cough, COPD and shortness of breath.    Gastrointestinal:  Negative for abdominal pain, nausea, vomiting and diarrhea.   Genitourinary:  Negative for dysuria.   Musculoskeletal:  Negative for muscle ache.   Skin:  Negative for rash.   Allergic/Immunologic: Negative for sneezing.   Neurological:  Negative for dizziness and headaches.      Objective:     Physical Exam   Constitutional: He is oriented to person, place, and time. He appears well-developed.   HENT:   Head: Normocephalic and atraumatic.   Ears:   Right Ear: External ear normal.   Left Ear: External ear normal.   Nose: Nose normal.   Mouth/Throat: Oropharynx is clear and moist. Mucous membranes are moist. Oropharynx is clear.   Eyes: Conjunctivae, EOM and lids are normal. Pupils are equal, round, and " reactive to light.   Neck: Trachea normal and phonation normal. Neck supple.   Cardiovascular: Normal rate, regular rhythm, normal heart sounds and normal pulses.   Pulmonary/Chest: Effort normal. No respiratory distress. He has wheezes.   Musculoskeletal: Normal range of motion.         General: Normal range of motion.   Neurological: He is alert and oriented to person, place, and time.   Skin: Skin is warm, dry and intact. Capillary refill takes less than 2 seconds.   Psychiatric: His speech is normal and behavior is normal. Judgment and thought content normal.   Nursing note and vitals reviewed.    Assessment:     1. COPD exacerbation      Plan:     COPD exacerbation  -     XR CHEST PA AND LATERAL; Future; Expected date: 01/22/2024  -     albuterol nebulizer solution 2.5 mg  -     ipratropium 0.02 % nebulizer solution 0.5 mg  -     azithromycin (Z-BHAVANA) 250 MG tablet; Take 2 tablets by mouth on day 1; Take 1 tablet by mouth on days 2-5  Dispense: 6 tablet; Refill: 0  -     predniSONE (DELTASONE) 20 MG tablet; Take 2 tablets (40 mg total) by mouth once daily. for 5 days  Dispense: 10 tablet; Refill: 0         Additional MDM:     Heart Failure Score:   COPD = Yes        Patient Instructions   - Take steroids and antibiotics as prescribed    - Follow up with your PCP or specialty clinic as directed in the next 1-2 weeks if not improved or as needed.  You can call [phone] to schedule an appointment with the appropriate provider.    - Go to the ER or seek medical attention immediately if you develop new or worsening symptoms.    - You must understand that you have received an Urgent Care treatment only and that you may be released before all of your medical problems are known or treated.   - You, the patient, will arrange for follow up care as instructed.   - If your condition worsens or fails to improve we recommend that you receive another evaluation at the ER immediately or contact your PCP to discuss your  concerns or return here.

## 2024-01-22 NOTE — PATIENT INSTRUCTIONS
- Take steroids and antibiotics as prescribed    - Follow up with your PCP or specialty clinic as directed in the next 1-2 weeks if not improved or as needed.  You can call [phone] to schedule an appointment with the appropriate provider.    - Go to the ER or seek medical attention immediately if you develop new or worsening symptoms.    - You must understand that you have received an Urgent Care treatment only and that you may be released before all of your medical problems are known or treated.   - You, the patient, will arrange for follow up care as instructed.   - If your condition worsens or fails to improve we recommend that you receive another evaluation at the ER immediately or contact your PCP to discuss your concerns or return here.     
Yes, see eMAR

## 2024-03-15 ENCOUNTER — OCCUPATIONAL HEALTH (OUTPATIENT)
Dept: URGENT CARE | Facility: CLINIC | Age: 69
End: 2024-03-15

## 2024-03-15 DIAGNOSIS — Z13.9 ENCOUNTER FOR SCREENING: Primary | ICD-10-CM

## 2024-03-15 LAB — BREATH ALCOHOL: 0

## 2024-03-15 PROCEDURE — 82075 ASSAY OF BREATH ETHANOL: CPT | Mod: S$GLB,,, | Performed by: NURSE PRACTITIONER

## 2024-03-15 PROCEDURE — 80305 DRUG TEST PRSMV DIR OPT OBS: CPT | Mod: S$GLB,,, | Performed by: NURSE PRACTITIONER

## 2024-07-29 ENCOUNTER — TELEPHONE (OUTPATIENT)
Dept: ADMINISTRATIVE | Facility: HOSPITAL | Age: 69
End: 2024-07-29
Payer: MEDICARE

## 2024-07-29 ENCOUNTER — PATIENT OUTREACH (OUTPATIENT)
Dept: ADMINISTRATIVE | Facility: HOSPITAL | Age: 69
End: 2024-07-29
Payer: MEDICARE

## 2024-07-29 DIAGNOSIS — Z12.11 ENCOUNTER FOR SCREENING FOR COLORECTAL MALIGNANT NEOPLASM: Primary | ICD-10-CM

## 2024-07-29 DIAGNOSIS — Z12.12 ENCOUNTER FOR SCREENING FOR COLORECTAL MALIGNANT NEOPLASM: Primary | ICD-10-CM

## 2024-07-29 NOTE — TELEPHONE ENCOUNTER
Need Statin Rx - ASCVD Dx        Pt needs exclusion codes dropped this year or Statin prescribed and dispensed    Exclusion/ Intolerance Codes:  Myalgia (M79.10)  Myositis (M60.90)  Myopathy (G72.0, G72.2, G72.9)  Rhabdomyolysis (M62.82)  Hospice  Palliative Care  65 y/o & > Frailty & Advanced Illness   65 y/o & > Live in long-term institution or enrolled in institutional SNP for more 90 days    *Exclusions need to be documented annually as an active problem and dropped on a claim every year - starts over every January 1    *Frailty: need 2 indications of frailty on different dates during the year

## 2024-08-15 ENCOUNTER — OFFICE VISIT (OUTPATIENT)
Dept: PULMONOLOGY | Facility: CLINIC | Age: 69
End: 2024-08-15
Payer: MEDICARE

## 2024-08-15 VITALS
HEART RATE: 77 BPM | OXYGEN SATURATION: 90 % | BODY MASS INDEX: 19.43 KG/M2 | DIASTOLIC BLOOD PRESSURE: 104 MMHG | WEIGHT: 131.19 LBS | HEIGHT: 69 IN | SYSTOLIC BLOOD PRESSURE: 146 MMHG

## 2024-08-15 DIAGNOSIS — J44.1 COPD EXACERBATION: ICD-10-CM

## 2024-08-15 DIAGNOSIS — R06.09 DYSPNEA ON EXERTION: Primary | ICD-10-CM

## 2024-08-15 DIAGNOSIS — F17.200 TOBACCO DEPENDENCE: ICD-10-CM

## 2024-08-15 DIAGNOSIS — Z72.0 TOBACCO ABUSE: ICD-10-CM

## 2024-08-15 DIAGNOSIS — J43.2 CENTRILOBULAR EMPHYSEMA: ICD-10-CM

## 2024-08-15 PROCEDURE — 99999 PR PBB SHADOW E&M-EST. PATIENT-LVL V: CPT | Mod: PBBFAC,HCNC,, | Performed by: INTERNAL MEDICINE

## 2024-08-15 RX ORDER — SODIUM CHLORIDE FOR INHALATION 3 %
VIAL, NEBULIZER (ML) INHALATION
Qty: 360 ML | Refills: 3 | Status: SHIPPED | OUTPATIENT
Start: 2024-08-15

## 2024-08-15 RX ORDER — PREDNISONE 20 MG/1
40 TABLET ORAL DAILY PRN
Qty: 10 TABLET | Refills: 0 | Status: SHIPPED | OUTPATIENT
Start: 2024-08-15 | End: 2024-08-20

## 2024-08-15 RX ORDER — IPRATROPIUM BROMIDE AND ALBUTEROL SULFATE 2.5; .5 MG/3ML; MG/3ML
3 SOLUTION RESPIRATORY (INHALATION) EVERY 6 HOURS PRN
Qty: 500 ML | Refills: 12 | Status: SHIPPED | OUTPATIENT
Start: 2024-08-15

## 2024-08-15 NOTE — PROGRESS NOTES
Dieter Ayala  was seen as a new patient at the request  Yamsine Baer * for the evaluation of  copd.    CHIEF COMPLAINT:  COPD      HISTORY OF PRESENT ILLNESS: Dieter Ayala is a 69 y.o. male  has a past medical history of COPD (chronic obstructive pulmonary disease), Hypertension, and Tobacco dependence (03/18/2016).  Patient presented to ED several time this year for cough and sob.  Patient was treated with steroid and antibiotics for copd exacerbation.  Patient was referred by ED to pulm clinic for further inputs.  Currently, patient with ku x 10 feet.  Currently on Trelegy and albuterol nebs.  No fever/chill.  No chest pain.  Coughing is worse with supine position.   Intermittent nasal congestion.  No gerd symptoms.        Additional Pulmonary History:   Occupational/Environmental Exposures:   x 50 years.  Exposure to Animals/Pets:  dogs and cats  Foreign Travel History:  denied  History of exposures to TB:  denied   Family History of Lung Cancer:  denied   Tobacco:  Still smoke 1 ppd since teenager  Childhood history of Lung Disease:  denied  Chest surgery or trauma:  denied      PAST MEDICAL HISTORY:    Active Ambulatory Problems     Diagnosis Date Noted    Tobacco dependence 03/18/2016    Elevated blood pressure (not hypertension) 03/18/2016    Sensation of foreign body in eye 03/18/2016    Essential hypertension 03/18/2016    Foreign body of right eye 04/13/2022    Aortic atherosclerosis 04/26/2023    Centrilobular emphysema 08/15/2024    Dyspnea on exertion 08/15/2024     Resolved Ambulatory Problems     Diagnosis Date Noted    No Resolved Ambulatory Problems     Past Medical History:   Diagnosis Date    COPD (chronic obstructive pulmonary disease)     Hypertension                 PAST SURGICAL HISTORY:    Past Surgical History:   Procedure Laterality Date    ADENOIDECTOMY      APPENDECTOMY      TONSILLECTOMY           FAMILY HISTORY:                Family History   Problem Relation Name  Age of Onset    Heart disease Mother      ALS Brother      No Known Problems Father      No Known Problems Sister      No Known Problems Maternal Aunt      No Known Problems Maternal Uncle      No Known Problems Paternal Aunt      No Known Problems Paternal Uncle      No Known Problems Maternal Grandmother      No Known Problems Maternal Grandfather      No Known Problems Paternal Grandmother      No Known Problems Paternal Grandfather         SOCIAL HISTORY:          Tobacco:   Social History     Tobacco Use   Smoking Status Every Day    Current packs/day: 1.00    Average packs/day: 1 pack/day for 50.0 years (50.0 ttl pk-yrs)    Types: Cigarettes    Passive exposure: Past   Smokeless Tobacco Never     alcohol use:    Social History     Substance and Sexual Activity   Alcohol Use Yes                   ALLERGIES:    Review of patient's allergies indicates:   Allergen Reactions    Sulfa (sulfonamide antibiotics) Hives       CURRENT MEDICATIONS:    Current Outpatient Medications   Medication Sig Dispense Refill    albuterol (ACCUNEB) 0.63 mg/3 mL Nebu Take 3 mLs (0.63 mg total) by nebulization every 6 (six) hours as needed. Rescue 300 each 12    albuterol (PROVENTIL/VENTOLIN HFA) 90 mcg/actuation inhaler Inhale 2 puffs into the lungs every 4 (four) hours as needed for Wheezing or Shortness of Breath. Rescue 18 g 12    albuterol (PROVENTIL/VENTOLIN HFA) 90 mcg/actuation inhaler Inhale 1-2 puffs into the lungs every 6 (six) hours as needed for Wheezing or Shortness of Breath. Rescue 8 g 0    fluticasone-salmeterol diskus inhaler 250-50 mcg Inhale 1 puff into the lungs 2 (two) times daily. Controller 60 each 12    fluticasone-umeclidin-vilanter (TRELEGY ELLIPTA) 200-62.5-25 mcg inhaler Inhale 1 puff into the lungs once daily. 90 each 12    GAVILYTE-C 240-22.72-6.72 -5.84 gram SolR Take by mouth.      MULTIVIT-IRON-MIN-FOLIC ACID 3,500-18-0.4 UNIT-MG-MG ORAL CHEW Take by mouth.      albuterol-ipratropium (DUO-NEB) 2.5  "mg-0.5 mg/3 mL nebulizer solution Take 3 mLs by nebulization every 6 (six) hours as needed for Wheezing or Shortness of Breath. Rescue 500 mL 12    predniSONE (DELTASONE) 20 MG tablet Take 2 tablets (40 mg total) by mouth daily as needed. 10 tablet 0    sodium chloride 3% 3 % nebulizer solution 4 cc of 3% saline into nebs twice daily prn 360 mL 3    sodium chloride 3% 3 % nebulizer solution 4 cc of 3% saline into nebs twice daily prn 360 mL 3     No current facility-administered medications for this visit.                  REVIEW OF SYSTEMS:     Pulmonary related symptoms as per HPI.  Gen:  no weight loss, +weight loss 40 lbs since 2020, no fever, no night sweat, poor appetite  HEENT:  no visual changes, no sore throat, no hearing loss  CV:  per hpi  GI:  no melena, no hematochezia, no diarhea, no constipation.  :  no dysuria, no hematuria, no hesistancy, no dribbling  Neuro:  no syncope, no vertigo, no tinitus  Psych:  No homocide or suicide ideation; + depression that's improving.    Endocrine:  No heat or cold intolerance.  Sleep:  No snoring; no witnessed apnea.  Otherwise, a balance of systems reviewed is negative.          PHYSICAL EXAM:  Vitals:    08/15/24 0818   BP: (!) 146/104   Pulse: 77   SpO2: (!) 90%   Weight: 59.5 kg (131 lb 2.8 oz)   Height: 5' 9" (1.753 m)   PainSc: 0-No pain     Body mass index is 19.37 kg/m².     GENERAL:  well develop; no apparent distress  HEENT:  no nasal congestion; no discharge noted; class 2 modified mallampatti.   NECK:  supple; no palpable masses.  CARDIO: regular rate and rhythm  PULM:  clear to auscultation bilaterally; no intercostals retractions; no accessory muscle usage   ABDOMEN:  soft nontender/nondistended.  +bowel sound  EXTREMITIES no cce  NEURO:  CN II-XII intact.  5/5 motor in all extremities.  sensation grossly intact   to light touch.  PSYCH:  normal affect.  Alert and oriented x 4    LABS  Pulmonary Functions Testing Results(personally reviewed):  " none  ABG (personally reviewed):  none  CXR (personally reviewed):  1/22/24 hyperinflated lungs without consolidation or effusion.    CT CHEST(personally reviewed):  3/20/23 diffuse bilateral emphysematous changes.      ASSESSMENT/PLAN  Problem List Items Addressed This Visit       Centrilobular emphysema    Overview     ct with diffuse emphysematous changes with basilar bronchiectasis.    Advise patient to adhere to daily Trelegy.    Airway clearance with duonebs + hypertonic saline + CPT.    Pulmonary rehab after pft  6 min walk to qualify for oxygen         Dyspnea on exertion - Primary    Overview     Mainly driven by copd.  Baseline echo to assess pasp.           Relevant Orders    Echo Saline Bubble? No; Ultrasound enhancing contrast? No    Tobacco dependence    Overview     encourage smoking cessation.  Will smoking cessation          Other Visit Diagnoses       COPD exacerbation        Relevant Medications    predniSONE (DELTASONE) 20 MG tablet    Other Relevant Orders    Complete PFT with bronchodilator    Stress test, pulmonary    Tobacco abuse        Relevant Orders    Ambulatory referral/consult to Smoking Cessation Program            Patient will No follow-ups on file.     CC: Send copy of this note to Yasmine Baer *

## 2024-08-21 ENCOUNTER — HOSPITAL ENCOUNTER (OUTPATIENT)
Dept: RESPIRATORY THERAPY | Facility: HOSPITAL | Age: 69
Discharge: HOME | End: 2024-08-21
Attending: INTERNAL MEDICINE
Payer: MEDICARE

## 2024-08-21 ENCOUNTER — TELEPHONE (OUTPATIENT)
Dept: ENDOSCOPY | Facility: HOSPITAL | Age: 69
End: 2024-08-21
Payer: MEDICARE

## 2024-08-21 ENCOUNTER — HOSPITAL ENCOUNTER (OUTPATIENT)
Dept: CARDIOLOGY | Facility: HOSPITAL | Age: 69
Discharge: HOME | End: 2024-08-21
Attending: INTERNAL MEDICINE
Payer: MEDICARE

## 2024-08-21 VITALS — HEART RATE: 97 BPM | RESPIRATION RATE: 18 BRPM | OXYGEN SATURATION: 99 %

## 2024-08-21 DIAGNOSIS — Z12.12 ENCOUNTER FOR SCREENING FOR COLORECTAL MALIGNANT NEOPLASM: Primary | ICD-10-CM

## 2024-08-21 DIAGNOSIS — Z12.11 ENCOUNTER FOR SCREENING FOR COLORECTAL MALIGNANT NEOPLASM: Primary | ICD-10-CM

## 2024-08-21 DIAGNOSIS — J44.1 COPD EXACERBATION: ICD-10-CM

## 2024-08-21 DIAGNOSIS — J43.2 CENTRILOBULAR EMPHYSEMA: Primary | ICD-10-CM

## 2024-08-21 DIAGNOSIS — R06.09 DYSPNEA ON EXERTION: ICD-10-CM

## 2024-08-21 DIAGNOSIS — Z12.11 SPECIAL SCREENING FOR MALIGNANT NEOPLASMS, COLON: ICD-10-CM

## 2024-08-21 LAB
6MWT: NORMAL
ASCENDING AORTA: 3.15 CM
AV INDEX (PROSTH): 0.64
AV MEAN GRADIENT: 3 MMHG
AV PEAK GRADIENT: 4 MMHG
AV VALVE AREA BY VELOCITY RATIO: 2.17 CM²
AV VALVE AREA: 1.94 CM²
AV VELOCITY RATIO: 0.72
BRPFT: NORMAL
CV ECHO LV RWT: 0.53 CM
DLCO ADJ PRE: 6.89 ML/(MIN*MMHG)
DLCO SINGLE BREATH LLN: 19.72
DLCO SINGLE BREATH PRE REF: 25.9 %
DLCO SINGLE BREATH REF: 26.65
DLCOC SBVA LLN: 2.67
DLCOC SBVA PRE REF: 44.8 %
DLCOC SBVA REF: 3.86
DLCOC SINGLE BREATH LLN: 19.72
DLCOC SINGLE BREATH PRE REF: 25.9 %
DLCOC SINGLE BREATH REF: 26.65
DLCOVA LLN: 2.67
DLCOVA PRE REF: 44.8 %
DLCOVA PRE: 1.73 ML/(MIN*MMHG*L)
DLCOVA REF: 3.86
DLVAADJ PRE: 1.73 ML/(MIN*MMHG*L)
DOP CALC AO PEAK VEL: 1.03 M/S
DOP CALC AO VTI: 19.6 CM
DOP CALC LVOT AREA: 3 CM2
DOP CALC LVOT DIAMETER: 1.96 CM
DOP CALC LVOT PEAK VEL: 0.74 M/S
DOP CALC LVOT STROKE VOLUME: 38 CM3
DOP CALC MV VTI: 14.9 CM
DOP CALCLVOT PEAK VEL VTI: 12.6 CM
E WAVE DECELERATION TIME: 168.4 MSEC
E/A RATIO: 0.58
E/E' RATIO: 5.41 M/S
ECHO LV POSTERIOR WALL: 0.98 CM (ref 0.6–1.1)
ERVN2 LLN: -16448.94
ERVN2 PRE REF: 137 %
ERVN2 PRE: 1.45 L
ERVN2 REF: 1.06
FEF 25 75 CHG: -1.6 %
FEF 25 75 LLN: 1.46
FEF 25 75 POST REF: 9.8 %
FEF 25 75 PRE REF: 10 %
FEF 25 75 REF: 3.17
FET100 CHG: 28.9 %
FEV1 CHG: 11.4 %
FEV1 FVC CHG: -11.7 %
FEV1 FVC LLN: 63
FEV1 FVC POST REF: 37.1 %
FEV1 FVC PRE REF: 42 %
FEV1 FVC REF: 76
FEV1 LLN: 2.31
FEV1 POST REF: 32.1 %
FEV1 PRE REF: 28.8 %
FEV1 REF: 3.17
FRACTIONAL SHORTENING: 36 % (ref 28–44)
FRCN2 LLN: 2.63
FRCN2 PRE REF: 120 %
FRCN2 REF: 3.62
FVC CHG: 26.2 %
FVC LLN: 3.1
FVC POST REF: 86.3 %
FVC PRE REF: 68.4 %
FVC REF: 4.16
INTERVENTRICULAR SEPTUM: 1.13 CM (ref 0.6–1.1)
IVC DIAMETER: 1.17 CM
IVC PRE: 2.48 L
IVC SINGLE BREATH LLN: 3.1
IVC SINGLE BREATH PRE REF: 59.7 %
IVC SINGLE BREATH REF: 4.16
LEFT ATRIUM SIZE: 2.94 CM
LEFT INTERNAL DIMENSION IN SYSTOLE: 2.36 CM (ref 2.1–4)
LEFT VENTRICLE DIASTOLIC VOLUME: 58.64 ML
LEFT VENTRICLE SYSTOLIC VOLUME: 19.42 ML
LEFT VENTRICULAR INTERNAL DIMENSION IN DIASTOLE: 3.71 CM (ref 3.5–6)
LEFT VENTRICULAR MASS: 122.13 G
LV LATERAL E/E' RATIO: 4.6 M/S
LV SEPTAL E/E' RATIO: 6.57 M/S
LVED V (TEICH): 58.64 ML
LVES V (TEICH): 19.42 ML
LVOT MG: 1.36 MMHG
LVOT MV: 0.55 CM/S
MV MEAN GRADIENT: 1 MMHG
MV PEAK A VEL: 0.79 M/S
MV PEAK E VEL: 0.46 M/S
MV PEAK GRADIENT: 3 MMHG
MV STENOSIS PRESSURE HALF TIME: 48.84 MS
MV VALVE AREA BY CONTINUITY EQUATION: 2.55 CM2
MV VALVE AREA P 1/2 METHOD: 4.5 CM2
OHS CV RV/LV RATIO: 0.86 CM
PEF CHG: 11.2 %
PEF LLN: 6.06
PEF POST REF: 35.5 %
PEF PRE REF: 31.9 %
PEF REF: 8.31
PISA TR MAX VEL: 1.61 M/S
POST FEF 25 75: 0.31 L/S
POST FET 100: 12.77 SEC
POST FEV1 FVC: 28.32 %
POST FEV1: 1.02 L
POST FVC: 3.59 L
POST PEF: 2.95 L/S
PRE DLCO: 6.89 ML/(MIN*MMHG)
PRE FEF 25 75: 0.32 L/S
PRE FET 100: 9.9 SEC
PRE FEV1 FVC: 32.09 %
PRE FEV1: 0.91 L
PRE FRC N2: 4.34 L
PRE FVC: 2.84 L
PRE PEF: 2.65 L/S
PV PEAK GRADIENT: 3 MMHG
PV PEAK VELOCITY: 0.87 M/S
RA MAJOR: 4.34 CM
RA PRESSURE ESTIMATED: 3 MMHG
RA WIDTH: 3.37 CM
RIGHT VENTRICULAR END-DIASTOLIC DIMENSION: 3.19 CM
RV TB RVSP: 5 MMHG
RVN2 LLN: 1.88
RVN2 PRE REF: 113 %
RVN2 PRE: 2.89 L
RVN2 REF: 2.56
RVN2TLCN2 LLN: 31.5
RVN2TLCN2 PRE REF: 121.4 %
RVN2TLCN2 PRE: 49.15 %
RVN2TLCN2 REF: 40.48
SINUS: 3.59 CM
STJ: 2.55 CM
TDI LATERAL: 0.1 M/S
TDI SEPTAL: 0.07 M/S
TDI: 0.09 M/S
TLCN2 LLN: 5.75
TLCN2 PRE REF: 85.2 %
TLCN2 PRE: 5.88 L
TLCN2 REF: 6.9
TR MAX PG: 10 MMHG
TV REST PULMONARY ARTERY PRESSURE: 13 MMHG
VA PRE: 3.99 L
VA SINGLE BREATH LLN: 6.75
VA SINGLE BREATH PRE REF: 59 %
VA SINGLE BREATH REF: 6.75
VCMAXN2 LLN: 3.1
VCMAXN2 PRE REF: 72 %
VCMAXN2 PRE: 2.99 L
VCMAXN2 REF: 4.16

## 2024-08-21 PROCEDURE — 94727 GAS DIL/WSHOT DETER LNG VOL: CPT | Mod: 26,,, | Performed by: INTERNAL MEDICINE

## 2024-08-21 PROCEDURE — 94200 LUNG FUNCTION TEST (MBC/MVV): CPT

## 2024-08-21 PROCEDURE — 94060 EVALUATION OF WHEEZING: CPT | Mod: 26,59,, | Performed by: INTERNAL MEDICINE

## 2024-08-21 PROCEDURE — 94729 DIFFUSING CAPACITY: CPT

## 2024-08-21 PROCEDURE — 94618 PULMONARY STRESS TESTING: CPT

## 2024-08-21 PROCEDURE — 94618 PULMONARY STRESS TESTING: CPT | Mod: 26,,, | Performed by: INTERNAL MEDICINE

## 2024-08-21 PROCEDURE — 25000242 PHARM REV CODE 250 ALT 637 W/ HCPCS: Performed by: INTERNAL MEDICINE

## 2024-08-21 PROCEDURE — 93306 TTE W/DOPPLER COMPLETE: CPT | Mod: 26,,, | Performed by: INTERNAL MEDICINE

## 2024-08-21 PROCEDURE — 93306 TTE W/DOPPLER COMPLETE: CPT

## 2024-08-21 PROCEDURE — 94729 DIFFUSING CAPACITY: CPT | Mod: 26,,, | Performed by: INTERNAL MEDICINE

## 2024-08-21 RX ORDER — ALBUTEROL SULFATE 2.5 MG/.5ML
2.5 SOLUTION RESPIRATORY (INHALATION) ONCE
Status: COMPLETED | OUTPATIENT
Start: 2024-08-21 | End: 2024-08-21

## 2024-08-21 RX ADMIN — ALBUTEROL SULFATE 2.5 MG: 2.5 SOLUTION RESPIRATORY (INHALATION) at 01:08

## 2024-08-21 NOTE — TELEPHONE ENCOUNTER
Spoke to patient to schedule procedure(s) Colonoscopy       Physician to perform procedure(s) Dr. APRIL Tinajero  Date of Procedure (s) 10/22/24  Arrival Time 7:30 AM  Time of Procedure(s) 8:30 AM   Location of Procedure(s) [address]   Type of Rx Prep sent to patient: PEG  Instructions provided to patient via [facility]    Patient was informed on the following information and verbalized understanding. Screening questionnaire reviewed with patient and complete. If procedure requires anesthesia, a responsible adult needs to be present to accompany the patient home, patient cannot drive after receiving anesthesia. Appointment details are tentative, especially check-in time. Patient will receive a prep-op call 7 days prior to confirm check-in time for procedure. If applicable the patient should contact their pharmacy to verify Rx for procedure prep is ready for pick-up. Patient was advised to call the scheduling department at [phone] if pharmacy states no Rx is available. Patient was advised to call the endoscopy scheduling department if any questions or concerns arise.      [facility] Scheduling Department

## 2024-08-23 ENCOUNTER — TELEPHONE (OUTPATIENT)
Dept: ENDOSCOPY | Facility: HOSPITAL | Age: 69
End: 2024-08-23
Payer: MEDICARE

## 2024-08-23 NOTE — TELEPHONE ENCOUNTER
----- Message from Kassy Hermosillo RN sent at 8/22/2024  7:26 AM CDT -----  Regarding: 10/22 Jose Ricks RN  [facility] Endoscopy Scheduling Anticoag/Clearances  Caller: Unspecified (Yesterday,  2:32 PM)  The patient is currently under an internal pulmonologist Deep Yancey MD care and requires a clearance Pulmonology  for their upcoming scheduled Colonoscopy on 10/22/24.

## 2024-08-23 NOTE — TELEPHONE ENCOUNTER
Dear Dr Yancey,    Patient has a scheduled procedure Colonoscopy 10/22/24 and in order to ensure patient safety, we would like to confirm if he/she can be cleared for the procedure.      Thank you for your prompt reply.    [facility] Endoscopy Scheduling

## 2024-08-26 ENCOUNTER — TELEPHONE (OUTPATIENT)
Dept: ADMINISTRATIVE | Facility: CLINIC | Age: 69
End: 2024-08-26
Payer: MEDICARE

## 2024-08-26 ENCOUNTER — TELEPHONE (OUTPATIENT)
Dept: FAMILY MEDICINE | Facility: CLINIC | Age: 69
End: 2024-08-26
Payer: MEDICARE

## 2024-08-26 NOTE — TELEPHONE ENCOUNTER
I left a voice mail reminder for the patient in regards to an upcoming appointment on 08/27/24 with Dr. Healy.

## 2024-08-27 ENCOUNTER — OFFICE VISIT (OUTPATIENT)
Dept: FAMILY MEDICINE | Facility: CLINIC | Age: 69
End: 2024-08-27
Payer: MEDICARE

## 2024-08-27 ENCOUNTER — LAB VISIT (OUTPATIENT)
Dept: LAB | Facility: HOSPITAL | Age: 69
End: 2024-08-27
Attending: INTERNAL MEDICINE
Payer: MEDICARE

## 2024-08-27 VITALS
DIASTOLIC BLOOD PRESSURE: 78 MMHG | TEMPERATURE: 98 F | HEART RATE: 78 BPM | WEIGHT: 129 LBS | SYSTOLIC BLOOD PRESSURE: 92 MMHG | BODY MASS INDEX: 19.11 KG/M2 | OXYGEN SATURATION: 92 % | HEIGHT: 69 IN

## 2024-08-27 VITALS
SYSTOLIC BLOOD PRESSURE: 130 MMHG | HEART RATE: 77 BPM | TEMPERATURE: 98 F | HEIGHT: 69 IN | WEIGHT: 129.75 LBS | DIASTOLIC BLOOD PRESSURE: 89 MMHG | BODY MASS INDEX: 19.22 KG/M2 | OXYGEN SATURATION: 98 %

## 2024-08-27 DIAGNOSIS — Z87.891 PERSONAL HISTORY OF NICOTINE DEPENDENCE: ICD-10-CM

## 2024-08-27 DIAGNOSIS — Z11.59 NEED FOR HEPATITIS C SCREENING TEST: ICD-10-CM

## 2024-08-27 DIAGNOSIS — F17.200 TOBACCO DEPENDENCE: ICD-10-CM

## 2024-08-27 DIAGNOSIS — Z00.00 ENCOUNTER FOR PREVENTIVE HEALTH EXAMINATION: ICD-10-CM

## 2024-08-27 DIAGNOSIS — Z12.11 SCREENING FOR COLORECTAL CANCER: ICD-10-CM

## 2024-08-27 DIAGNOSIS — D75.1 POLYCYTHEMIA: ICD-10-CM

## 2024-08-27 DIAGNOSIS — Z00.00 ENCOUNTER FOR MEDICARE ANNUAL WELLNESS EXAM: ICD-10-CM

## 2024-08-27 DIAGNOSIS — J43.9 PULMONARY EMPHYSEMA, UNSPECIFIED EMPHYSEMA TYPE: ICD-10-CM

## 2024-08-27 DIAGNOSIS — Z71.89 ADVANCED DIRECTIVES, COUNSELING/DISCUSSION: ICD-10-CM

## 2024-08-27 DIAGNOSIS — Z00.00 ROUTINE MEDICAL EXAM: ICD-10-CM

## 2024-08-27 DIAGNOSIS — Z12.12 SCREENING FOR COLORECTAL CANCER: ICD-10-CM

## 2024-08-27 DIAGNOSIS — Z12.5 SCREENING FOR PROSTATE CANCER: ICD-10-CM

## 2024-08-27 DIAGNOSIS — J43.2 CENTRILOBULAR EMPHYSEMA: ICD-10-CM

## 2024-08-27 DIAGNOSIS — N40.0 BENIGN PROSTATIC HYPERPLASIA, UNSPECIFIED WHETHER LOWER URINARY TRACT SYMPTOMS PRESENT: ICD-10-CM

## 2024-08-27 DIAGNOSIS — I10 ESSENTIAL HYPERTENSION: ICD-10-CM

## 2024-08-27 DIAGNOSIS — D69.2 OTHER NONTHROMBOCYTOPENIC PURPURA: ICD-10-CM

## 2024-08-27 DIAGNOSIS — F17.200 TOBACCO DEPENDENCE: Primary | ICD-10-CM

## 2024-08-27 DIAGNOSIS — R19.5 POSITIVE COLORECTAL CANCER SCREENING USING COLOGUARD TEST: ICD-10-CM

## 2024-08-27 DIAGNOSIS — D69.2 SENILE PURPURA: ICD-10-CM

## 2024-08-27 DIAGNOSIS — J44.9 COPD, SEVERE: ICD-10-CM

## 2024-08-27 DIAGNOSIS — I70.0 AORTIC ATHEROSCLEROSIS: ICD-10-CM

## 2024-08-27 DIAGNOSIS — R63.4 UNINTENTIONAL WEIGHT LOSS: ICD-10-CM

## 2024-08-27 DIAGNOSIS — Z00.00 ROUTINE MEDICAL EXAM: Primary | ICD-10-CM

## 2024-08-27 LAB
ALBUMIN SERPL BCP-MCNC: 4 G/DL (ref 3.5–5.2)
ALP SERPL-CCNC: 83 U/L (ref 55–135)
ALT SERPL W/O P-5'-P-CCNC: 15 U/L (ref 10–44)
ANION GAP SERPL CALC-SCNC: 9 MMOL/L (ref 8–16)
AST SERPL-CCNC: 22 U/L (ref 10–40)
BASOPHILS # BLD AUTO: 0.08 K/UL (ref 0–0.2)
BASOPHILS NFR BLD: 0.9 % (ref 0–1.9)
BILIRUB SERPL-MCNC: 0.5 MG/DL (ref 0.1–1)
BUN SERPL-MCNC: 11 MG/DL (ref 8–23)
CALCIUM SERPL-MCNC: 9.8 MG/DL (ref 8.7–10.5)
CHLORIDE SERPL-SCNC: 103 MMOL/L (ref 95–110)
CHOLEST SERPL-MCNC: 184 MG/DL (ref 120–199)
CHOLEST/HDLC SERPL: 3.1 {RATIO} (ref 2–5)
CO2 SERPL-SCNC: 29 MMOL/L (ref 23–29)
COMPLEXED PSA SERPL-MCNC: 0.86 NG/ML (ref 0–4)
CREAT SERPL-MCNC: 1.1 MG/DL (ref 0.5–1.4)
DIFFERENTIAL METHOD BLD: ABNORMAL
EOSINOPHIL # BLD AUTO: 0.2 K/UL (ref 0–0.5)
EOSINOPHIL NFR BLD: 1.9 % (ref 0–8)
ERYTHROCYTE [DISTWIDTH] IN BLOOD BY AUTOMATED COUNT: 14.6 % (ref 11.5–14.5)
EST. GFR  (NO RACE VARIABLE): >60 ML/MIN/1.73 M^2
GLUCOSE SERPL-MCNC: 87 MG/DL (ref 70–110)
HCT VFR BLD AUTO: 50.9 % (ref 40–54)
HCV AB SERPL QL IA: NORMAL
HDLC SERPL-MCNC: 60 MG/DL (ref 40–75)
HDLC SERPL: 32.6 % (ref 20–50)
HGB BLD-MCNC: 16.7 G/DL (ref 14–18)
IMM GRANULOCYTES # BLD AUTO: 0.04 K/UL (ref 0–0.04)
IMM GRANULOCYTES NFR BLD AUTO: 0.4 % (ref 0–0.5)
LDLC SERPL CALC-MCNC: 108.4 MG/DL (ref 63–159)
LYMPHOCYTES # BLD AUTO: 1.8 K/UL (ref 1–4.8)
LYMPHOCYTES NFR BLD: 19.2 % (ref 18–48)
MCH RBC QN AUTO: 31 PG (ref 27–31)
MCHC RBC AUTO-ENTMCNC: 32.8 G/DL (ref 32–36)
MCV RBC AUTO: 95 FL (ref 82–98)
MONOCYTES # BLD AUTO: 0.7 K/UL (ref 0.3–1)
MONOCYTES NFR BLD: 7.7 % (ref 4–15)
NEUTROPHILS # BLD AUTO: 6.6 K/UL (ref 1.8–7.7)
NEUTROPHILS NFR BLD: 69.9 % (ref 38–73)
NONHDLC SERPL-MCNC: 124 MG/DL
NRBC BLD-RTO: 0 /100 WBC
PLATELET # BLD AUTO: 237 K/UL (ref 150–450)
PMV BLD AUTO: 10.5 FL (ref 9.2–12.9)
POTASSIUM SERPL-SCNC: 5.4 MMOL/L (ref 3.5–5.1)
PROT SERPL-MCNC: 7.4 G/DL (ref 6–8.4)
RBC # BLD AUTO: 5.38 M/UL (ref 4.6–6.2)
SODIUM SERPL-SCNC: 141 MMOL/L (ref 136–145)
T4 FREE SERPL-MCNC: 1.06 NG/DL (ref 0.71–1.51)
TRIGL SERPL-MCNC: 78 MG/DL (ref 30–150)
TSH SERPL DL<=0.005 MIU/L-ACNC: 2.81 UIU/ML (ref 0.4–4)
WBC # BLD AUTO: 9.37 K/UL (ref 3.9–12.7)

## 2024-08-27 PROCEDURE — 3078F DIAST BP <80 MM HG: CPT | Mod: CPTII,S$GLB,, | Performed by: INTERNAL MEDICINE

## 2024-08-27 PROCEDURE — 84443 ASSAY THYROID STIM HORMONE: CPT | Performed by: INTERNAL MEDICINE

## 2024-08-27 PROCEDURE — 99214 OFFICE O/P EST MOD 30 MIN: CPT | Mod: 25,S$GLB,, | Performed by: INTERNAL MEDICINE

## 2024-08-27 PROCEDURE — 85025 COMPLETE CBC W/AUTO DIFF WBC: CPT | Performed by: INTERNAL MEDICINE

## 2024-08-27 PROCEDURE — 99397 PER PM REEVAL EST PAT 65+ YR: CPT | Mod: 25,S$GLB,, | Performed by: INTERNAL MEDICINE

## 2024-08-27 PROCEDURE — 3079F DIAST BP 80-89 MM HG: CPT | Mod: CPTII,S$GLB,,

## 2024-08-27 PROCEDURE — 80053 COMPREHEN METABOLIC PANEL: CPT | Performed by: INTERNAL MEDICINE

## 2024-08-27 PROCEDURE — 1159F MED LIST DOCD IN RCRD: CPT | Mod: CPTII,S$GLB,,

## 2024-08-27 PROCEDURE — 99999 PR PBB SHADOW E&M-EST. PATIENT-LVL V: CPT | Mod: PBBFAC,,,

## 2024-08-27 PROCEDURE — 3008F BODY MASS INDEX DOCD: CPT | Mod: CPTII,S$GLB,, | Performed by: INTERNAL MEDICINE

## 2024-08-27 PROCEDURE — 1158F ADVNC CARE PLAN TLK DOCD: CPT | Mod: CPTII,S$GLB,, | Performed by: INTERNAL MEDICINE

## 2024-08-27 PROCEDURE — 80061 LIPID PANEL: CPT | Performed by: INTERNAL MEDICINE

## 2024-08-27 PROCEDURE — 84153 ASSAY OF PSA TOTAL: CPT | Performed by: INTERNAL MEDICINE

## 2024-08-27 PROCEDURE — 3075F SYST BP GE 130 - 139MM HG: CPT | Mod: CPTII,S$GLB,,

## 2024-08-27 PROCEDURE — 1126F AMNT PAIN NOTED NONE PRSNT: CPT | Mod: CPTII,S$GLB,, | Performed by: INTERNAL MEDICINE

## 2024-08-27 PROCEDURE — G0439 PPPS, SUBSEQ VISIT: HCPCS | Mod: S$GLB,,,

## 2024-08-27 PROCEDURE — 1101F PT FALLS ASSESS-DOCD LE1/YR: CPT | Mod: CPTII,S$GLB,,

## 2024-08-27 PROCEDURE — 1160F RVW MEDS BY RX/DR IN RCRD: CPT | Mod: CPTII,S$GLB,,

## 2024-08-27 PROCEDURE — 86803 HEPATITIS C AB TEST: CPT

## 2024-08-27 PROCEDURE — 1158F ADVNC CARE PLAN TLK DOCD: CPT | Mod: CPTII,S$GLB,,

## 2024-08-27 PROCEDURE — 36415 COLL VENOUS BLD VENIPUNCTURE: CPT | Mod: PO

## 2024-08-27 PROCEDURE — 84439 ASSAY OF FREE THYROXINE: CPT | Performed by: INTERNAL MEDICINE

## 2024-08-27 PROCEDURE — 1170F FXNL STATUS ASSESSED: CPT | Mod: CPTII,S$GLB,,

## 2024-08-27 PROCEDURE — 3074F SYST BP LT 130 MM HG: CPT | Mod: CPTII,S$GLB,, | Performed by: INTERNAL MEDICINE

## 2024-08-27 PROCEDURE — 3288F FALL RISK ASSESSMENT DOCD: CPT | Mod: CPTII,S$GLB,,

## 2024-08-27 PROCEDURE — 99999 PR PBB SHADOW E&M-EST. PATIENT-LVL III: CPT | Mod: PBBFAC,,, | Performed by: INTERNAL MEDICINE

## 2024-08-27 NOTE — ASSESSMENT & PLAN NOTE
Stable, asymptomatic chronic condition.  Will continue to maximize risk factor reduction and adjust medication as needed

## 2024-08-27 NOTE — ASSESSMENT & PLAN NOTE
F/b Dr. Yancey. The current medical regimen is effective;  continue present plan and medications.

## 2024-08-27 NOTE — ASSESSMENT & PLAN NOTE
I counseled the patient on smoking cessation for >3 min, risks associated with smoking, having a quit date, nicotine replacement, medications that can aid in cessation, and having a plan for future cravings.  The patient stated that he is ready. Printed copies of recent xrays per pt request to remind pt on smoking cessation.

## 2024-08-27 NOTE — PROGRESS NOTES
Chief complaint:   Physical exam    Patient is a 69-year-old white male here with his female partner new 8/22. Last seen 10/23.   Both of them really do not go see the doctor.  Last PSA 2022 ?. Colon set a few times, set for October    Regarding health maintenance   Never had any colon screening is open to doing coloGuard but not colonoscopy- test POS 9/22.    Discussed the possibility of polyps and or colon cancer and the significant need for colonoscopy and after that long discussion they are willing to and were encouraged to follow through when they get a call to schedule.    4 calls by scheduling for colon  Called patient to schedule for a colonoscopy. No answer. Left message for patient to call Endoscopy Scheduling. Phone number provided.          ROS:   CONST: weight stable but lost weight over two years.. EYES: no vision change. ENT: no sore throat. CV: no chest pain w/ exertion. RESP: no shortness of breath that is new.. GI: no nausea, vomiting, diarrhea. No dysphagia. : no urinary issues. MUSCULOSKELETAL: no new myalgias or arthralgias. SKIN: no new changes. NEURO: no focal deficits. PSYCH: no new issues. ENDOCRINE: no polyuria. HEME: no lymph nodes. ALLERGY: no general pruritis.      Past Medical History:   Diagnosis Date    COPD (chronic obstructive pulmonary disease)     Hypertension     Tobacco dependence 03/18/2016   Pos cologuard 9/22    Past Surgical History:   Procedure Laterality Date    ADENOIDECTOMY      APPENDECTOMY      TONSILLECTOMY       Social History     Socioeconomic History    Marital status:    Tobacco Use    Smoking status: Every Day     Current packs/day: 1.00     Average packs/day: 1 pack/day for 50.0 years (50.0 ttl pk-yrs)     Types: Cigarettes     Passive exposure: Past    Smokeless tobacco: Never   Substance and Sexual Activity    Alcohol use: Yes     Comment: rarely    Drug use: Never    Sexual activity: Yes     Social Determinants of Health     Financial Resource  Strain: Low Risk  (8/27/2024)    Overall Financial Resource Strain (CARDIA)     Difficulty of Paying Living Expenses: Not hard at all   Food Insecurity: No Food Insecurity (8/27/2024)    Hunger Vital Sign     Worried About Running Out of Food in the Last Year: Never true     Ran Out of Food in the Last Year: Never true   Transportation Needs: No Transportation Needs (8/27/2024)    PRAPARE - Transportation     Lack of Transportation (Medical): No     Lack of Transportation (Non-Medical): No   Physical Activity: Insufficiently Active (8/27/2024)    Exercise Vital Sign     Days of Exercise per Week: 3 days     Minutes of Exercise per Session: 20 min   Stress: No Stress Concern Present (8/27/2024)    [place] [employer] - Occupational Stress Questionnaire     Feeling of Stress : Not at all   Housing Stability: Low Risk  (8/27/2024)    Housing Stability Vital Sign     Unable to Pay for Housing in the Last Year: No     Homeless in the Last Year: No     family history includes ALS in his brother; Heart disease in his mother; No Known Problems in his father, maternal aunt, maternal grandfather, maternal grandmother, maternal uncle, paternal aunt, paternal grandfather, paternal grandmother, paternal uncle, and sister.  Sister had hepatitis B and apparently mom had some hepatitis B and so he has antibodies    Gen: no distress  EYES: conjunctiva clear, non-icteric, PERRL  ENT: nose clear, nasal mucosa normal, oropharynx clear and moist, teeth poor  NECK:supple, thyroid non-palpable  RESP: effort is good, lungs and expiratory wheezing throughout  CV: heart RRR w/o murmur, gallops or rubs; no carotid bruits, no edema  GI: abdomen soft, non-distended, non-tender,   MS: gait normal, no clubbing or cyanosis of the digits  SKIN: no rashes, warm to touch, black seborrheic keratosis on the right temple, senile purpura and ecchymosis on the forearms only         Prior labs reviewed         Dieter was seen today for  annual exam.    Diagnoses and all orders for this visit:    Routine medical exam  -     Prostate Specific Antigen, Diagnostic; Future  -     TSH; Future  -     T4, Free; Future  -     Lipid Panel; Future  -     CBC Auto Differential; Future  -     Comprehensive Metabolic Panel; Future    Screening for prostate cancer  -     Prostate Specific Antigen, Diagnostic; Future    Screening for colorectal cancer                                                                  Additional evaluation and management issues:     Additionally patient has numerous other medical issues to address separate from his physical.  Now seeing Pulmonary for COPD -Spirometry shows very severe obstruction. Lung volume determination is normal. Spirometry shows borderline improvement following bronchodilator. DLCO is severely decreased.      Reviewed  COPD exacerbation.  Discussed the pathophysiology of them.  He recently had a URI but does not appear to have led to an exacerbation.  I encouraged him to get a pulse oximeter as today his baseline looks like 92% although it a annual wellness was 96%.  He needs to monitor with ambulation but apparently did have a walk test with and without oxygen recently and apparently he is not on home oxygen.  Looks like they gave him some  DuoNeb in he needs a refill of that.  Also discussed probable underlying COPD in the benefits of inhaled steroids and inhaled bronchodilators so will try to prescribe Trelegy which does appear to be preferred on his insurance but does not have part D now. .  We did look up [place] on the good Rx plan was about 98 dollars and they might be able to afford that currently.  We discussed acute treatment with albuterol and the DuoNeb may not be covered.  We discussed issues regarding Medicare coverage of the albuterol and the nebulizer and gave them printed prescriptions for the nebulizer.  He already has the tubing here with him in the office.  We discussed doing PFTs when his  "COPD exacerbation improves and he gets back to baseline.  We discussed emphysema.  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    9/27 ER -------Dieter Ayala is a 68 y.o. male, with a PMHx of COPD, hypertension, tobacco dependence who presents to the ED with shortness of breath for "a few days". Patient reports he was seen at this facility on 9/18/2023, was prescribed prednisone and azithromycin with for a few days. He states he notices symptoms are exacerbated with stress. 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 Has appointment with PCP in a few weeks  CXR -Lungs are hyperinflated and there is severe pulmonary emphysema.  Coarsened perihilar interstitial lung markings but no confluent area of consolidation.  Heart is not enlarged.  Atherosclerotic calcifications overlie the aortic arch.  Symmetric nodular densities overlie the lung bases, presumably nipple shadows.  No sizable pleural effusion.  No pneumothorax.   Given DuoNebs in the ED with significant improvement in symptoms.  Patient is requesting a prescription for a nebulizer machine for home use.  I will also prescribe DuoNeb solution, refill albuterol inhaler, and prescribed Mucinex.  I have encouraged follow up with PCP and pulmonology for further evaluation and management.    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  He lot of coughing and his blood pressure was high offshore but normal by the time he got to the ER.  He was sent in from the offshore rig.  He was not given an albuterol treatment or any antibiotics.  He was given prednisone and a steroid shot.  His wife gave him some leftover antibiotics from a tooth and he did get better over about two week period of time and discussed he may well have been getting better on his own and " antibiotic probably did not give him any benefit.  He does have some occasional coughing when he breathes or talks which likely suggest allergies and they have other allergy symptoms we discussed using a Claritin every day.  He did try the albuterol inhaler given any felt funny and we did discuss that it can increase heart rate and make you shaky.  He was holding his breath 10 seconds in between each puff which also likely made him lightheaded.      We discussed COPD at length as well as the extensive emphysema seen on the CT and that is irreversible.  There could be a reversible component if he has congestion and wheezing which could be smoker's cough, allergies and or COPD.  I encouraged him to use the inhaler to see if it allows him to do more with less shortness of breath but otherwise he really did not have any respiratory compromise prior.  We discussed maintenance medication with inhaled steroid should he find benefit from the albuterol and need to use the albuterol fairly often.     He is he has noticed some bruising on his forearms only.  We discussed is probably normal in age related due to minor trauma.  No other bruising or bleeding.  No other known history of any liver disease.    Lab work was all normal.    Regarding health maintenance   Never had any colon screening is open to doing coloGuard but not colonoscopy- test POS 9/22.    Discussed the possibility of polyps and or colon cancer and the significant need for colonoscopy and after that long discussion they are willing to and were encouraged to follow through when they get a call to schedule.    4 calls by scheduling for colon  Called patient to schedule for a colonoscopy. No answer. Left message for patient to call Endoscopy Scheduling. Phone number provided.      He has had decreased appetite over the last couple of years and lost about 45 lb over two years.  No early satiety or dysphagia.  He does not have symptoms of depression.  No symptoms  of thyroid dysfunction or tachycardia.  He has a brother with AL S.  He does smoke one pack per day.   His dyspnea on exertion is stable.  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  He does have expiratory wheezing counseling and probably has underlying COPD.          Evaluation and management of the separate issues will be based on medical decision making.  Labs, x-ray reports, PFTs and interval clinic notes reviewed               Assessment and plan:         COPD, severe, discussed medications and exacerbations.  He also has some chronic pain.  He is limited by breathing and musculoskeletal issues and can not walk 200 ft and sometimes uses a cane so he did request a handicap tag.  Application done by MD    Essential hypertension, chronic and stable, monitor at home, blood pressure was better at an earlier visit for some reason 92/78 and mine but asymptomatic  -     TSH; Future  -     T4, Free; Future  -     Lipid Panel; Future  -     CBC Auto Differential; Future  -     Comprehensive Metabolic Panel; Future    Positive colorectal cancer screening using Cologuard test      Pulmonary emphysema, unspecified emphysema type, severe COPD and emphysema, reassured patient on appropriate medications, keep follow-up with Pulmonary, discussed how smoking, exposures, allergies and respiratory infections can lead to COPD exacerbation.  Probably should monitor pulse ox at home    Unintentional weight loss, likely extending more calories with the work of breathing given the emphysema and so forth.  Discussed ways he can increase calorie intake    Tobacco dependence    Aortic atherosclerosis, chronic and stable    Senile purpura, chronic and stay    Polycythemia, reassess  -     CBC Auto Differential; Future    Centrilobular emphysema    Benign prostatic hyperplasia, unspecified whether lower urinary tract symptoms present, due for PSA  -     Prostate Specific Antigen,  Diagnostic; Future

## 2024-08-27 NOTE — PATIENT INSTRUCTIONS
Counseling and Referral of Other Preventative  (Italic type indicates deductible and co-insurance are waived)    Patient Name: Dieter Ayala  Today's Date: 8/27/2024    Health Maintenance       Date Due Completion Date    Hepatitis C Screening Never done ---    Shingles Vaccine (1 of 2) Never done ---    RSV Vaccine (Age 60+ and Pregnant patients) (1 - 1-dose 60+ series) Never done ---    Pneumococcal Vaccines (Age 65+) (2 of 2 - PCV) 03/18/2017 3/18/2016    Abdominal Aortic Aneurysm Screening Never done ---    COVID-19 Vaccine (3 - 2023-24 season) 09/01/2023 10/30/2021    LDCT Lung Screen 03/20/2024 3/20/2023    Influenza Vaccine (1) 09/01/2024 ---    Colorectal Cancer Screening 09/22/2025 9/22/2022    TETANUS VACCINE 04/12/2032 4/12/2022        Orders Placed This Encounter   Procedures    CT Chest Lung Screening Low Dose    US Abdominal Aorta    HEPATITIS C ANTIBODY    Ambulatory referral/consult to Smoking Cessation Program       The following information is provided to all patients.  This information is to help you find resources for any of the problems found today that may be affecting your health:                  Living healthy guide: www.[facility].[place].gov      Understanding Diabetes: www.diabetes.org      Eating healthy: www.cdc.gov/healthyweight      CDC home safety checklist: www.cdc.gov/steadi/patient.html      Agency on Aging: www.goea.[place].[facility]      Alcoholics anonymous (AA): www.aa.org      Physical Activity: www.edel.nih.gov/nc7kyuf      Tobacco use: www.quitwithusla.org

## 2024-08-27 NOTE — PROGRESS NOTES
HPI     Chief Complaint:  AWV    Dieter Ayala is a 69 y.o. male with multiple medical diagnoses as listed in the medical history and problem list that presented for a Medicare AWV and comprehensive Health Risk Assessment today.  Pt is new to me but is known to this clinic with his last appointment being Visit date not found.      The following components were reviewed and updated:    Medical history  Family History  Social history  Allergies and Current Medications  Health Risk Assessment  Health Maintenance  Care Team     HPI      Assessment & Plan     (all problems are new to me)    Diagnoses and health risks identified today and associated recommendations/orders:    Problem List Items Addressed This Visit          Pulmonary    Centrilobular emphysema    Overview     ct with diffuse emphysematous changes with basilar bronchiectasis.    Advise patient to adhere to daily Trelegy.    Airway clearance with duonebs + hypertonic saline + CPT.    Pulmonary rehab after pft  6 min walk to qualify for oxygen         Current Assessment & Plan     F/b Dr. Yancey. The current medical regimen is effective;  continue present plan and medications.           Relevant Orders    CT Chest Lung Screening Low Dose       Cardiac/Vascular    Aortic atherosclerosis    Overview     Xray 3/2023:    FINDINGS:  The trachea is unremarkable.  There are calcifications of the aortic knob         Current Assessment & Plan     Stable, asymptomatic chronic condition.  Will continue to maximize risk factor reduction and adjust medication as needed           Relevant Orders    US Abdominal Aorta       Hematology    Other nonthrombocytopenic purpura    Current Assessment & Plan     Stable, asymptomatic chronic condition.  Will continue to maximize risk factor reduction and adjust medication as needed                Other    Tobacco dependence - Primary    Overview     encourage smoking cessation.  Will smoking cessation         Current Assessment &  Plan      I counseled the patient on smoking cessation for >3 min, risks associated with smoking, having a quit date, nicotine replacement, medications that can aid in cessation, and having a plan for future cravings.  The patient stated that he is ready. Printed copies of recent xrays per pt request to remind pt on smoking cessation.            Relevant Orders    CT Chest Lung Screening Low Dose    Ambulatory referral/consult to Smoking Cessation Program     Other Visit Diagnoses       Personal history of nicotine dependence        Relevant Orders    CT Chest Lung Screening Low Dose    US Abdominal Aorta    Ambulatory referral/consult to Smoking Cessation Program    Need for hepatitis C screening test        Relevant Orders    HEPATITIS C ANTIBODY    Encounter for preventive health examination      Counseled on age appropriate medical preventative services including age appropriate cancer screenings, age appropriate eye and dental exams, over all nutritional health, need for a consistent exercise regimen, and an over all push towards maintaining a vigorous and active lifestyle.  Counseled on age appropriate vaccines and discussed upcoming health care needs based on age/gender. Discussed good sleep hygiene and stress management.    Has annual with PCP after this visit, will defer annual labs to him    Advanced directives, counseling/discussion      I offered to discuss advanced care planning, including how to pick a person who would make decisions for you if you were unable to make them for yourself, called a health care power of , and what kind of decisions you might make such as use of life sustaining treatments such as ventilators and tube feeding when faced with a life limiting illness recorded on a living will that they will need to know. (How you want to be cared for as you near the end of your natural life)     X Patient is interested in learning more about how to make advanced directives.  I provided  "them paperwork and offered to discuss this with them.        Encounter for Medicare annual wellness exam      Pt was seen today for an Annual Wellness visit. Healthcare maintenance and screening recommendations were discussed and updated as indicated. Return in one year for AWV.                --------------------------------------------    ** See Completed Assessments for Annual Wellness Visit within the encounter summary.**    The following assessments were completed:  Living Situation  CAGE  Depression Screening  Timed Get Up and Go  Whisper Test  Cognitive Function Screening  Nutrition Screening  ADL Screening  PAQ Screening      Provided Dieter Ayala  with a 5-10 year written screening schedule and personal prevention plan. Recommendations were developed using the USPSTF age appropriate recommendations. Education, counseling, and referrals were provided as needed. After Visit Summary printed and given to patient which includes a list of additional screenings\tests needed.    Review for Opioid Screening: Pt does not have Rx for Opioids     Review for Substance Use Disorders: Patient does not abuse use substances      Exam     Review of Systems:  (as noted above)  Review of Systems    Physical Exam:   Physical Exam  Constitutional:       General: He is not in acute distress.     Appearance: Normal appearance. He is not toxic-appearing.   Pulmonary:      Effort: Pulmonary effort is normal. No respiratory distress.   Skin:     Capillary Refill: Capillary refill takes less than 2 seconds.      Findings: Bruising present.   Neurological:      General: No focal deficit present.      Mental Status: He is alert and oriented to person, place, and time.       Vitals:    08/27/24 0909   BP: 130/89   Pulse: 77   Temp: 97.9 °F (36.6 °C)   TempSrc: Oral   SpO2: 98%   Weight: 58.8 kg (129 lb 11.9 oz)   Height: 5' 9" (1.753 m)      Body mass index is 19.16 kg/m².    Clock:              Health Maintenance:  Health Maintenance  "        Date Due Completion Date    Hepatitis C Screening Never done ---    Shingles Vaccine (1 of 2) Never done ---    RSV Vaccine (Age 60+ and Pregnant patients) (1 - 1-dose 60+ series) Never done ---    Pneumococcal Vaccines (Age 65+) (2 of 2 - PCV) 03/18/2017 3/18/2016    Abdominal Aortic Aneurysm Screening Never done ---    COVID-19 Vaccine (3 - 2023-24 season) 09/01/2023 10/30/2021    LDCT Lung Screen 03/20/2024 3/20/2023    Influenza Vaccine (1) 09/01/2024 ---    Colorectal Cancer Screening 09/22/2025 9/22/2022    TETANUS VACCINE 04/12/2032 4/12/2022            Health maintenance reviewed and Advised patient on the importance of completing overdue health maintenance items    Declines overdue vaccines. Will discuss further with PCP      Follow Up:  No follow-ups on file.    History     Past Medical History:  Past Medical History:   Diagnosis Date    COPD (chronic obstructive pulmonary disease)     Hypertension     Tobacco dependence 03/18/2016       Past Surgical History:  Past Surgical History:   Procedure Laterality Date    ADENOIDECTOMY      APPENDECTOMY      TONSILLECTOMY         Social History:  Social History     Socioeconomic History    Marital status:    Tobacco Use    Smoking status: Every Day     Current packs/day: 1.00     Average packs/day: 1 pack/day for 50.0 years (50.0 ttl pk-yrs)     Types: Cigarettes     Passive exposure: Past    Smokeless tobacco: Never   Substance and Sexual Activity    Alcohol use: Yes     Comment: rarely    Drug use: Never    Sexual activity: Yes     Social Determinants of Health     Financial Resource Strain: Low Risk  (8/27/2024)    Overall Financial Resource Strain (CARDIA)     Difficulty of Paying Living Expenses: Not hard at all   Food Insecurity: No Food Insecurity (8/27/2024)    Hunger Vital Sign     Worried About Running Out of Food in the Last Year: Never true     Ran Out of Food in the Last Year: Never true   Transportation Needs: No Transportation Needs  (8/27/2024)    PRAPARE - Transportation     Lack of Transportation (Medical): No     Lack of Transportation (Non-Medical): No   Physical Activity: Insufficiently Active (8/27/2024)    Exercise Vital Sign     Days of Exercise per Week: 3 days     Minutes of Exercise per Session: 20 min   Stress: No Stress Concern Present (8/27/2024)    [employer] [place] [employer] - Occupational Stress Questionnaire     Feeling of Stress : Not at all   Housing Stability: Low Risk  (8/27/2024)    Housing Stability Vital Sign     Unable to Pay for Housing in the Last Year: No     Homeless in the Last Year: No       Family History:  Family History   Problem Relation Name Age of Onset    Heart disease Mother      No Known Problems Father      No Known Problems Sister      ALS Brother      No Known Problems Maternal Aunt      No Known Problems Maternal Uncle      No Known Problems Paternal Aunt      No Known Problems Paternal Uncle      No Known Problems Maternal Grandmother      No Known Problems Maternal Grandfather      No Known Problems Paternal Grandmother      No Known Problems Paternal Grandfather         Allergies and Medications: (updated and reviewed)  Review of patient's allergies indicates:   Allergen Reactions    Sulfa (sulfonamide antibiotics) Hives     Current Outpatient Medications   Medication Sig Dispense Refill    albuterol (ACCUNEB) 0.63 mg/3 mL Nebu Take 3 mLs (0.63 mg total) by nebulization every 6 (six) hours as needed. Rescue 300 each 12    albuterol (PROVENTIL/VENTOLIN HFA) 90 mcg/actuation inhaler Inhale 2 puffs into the lungs every 4 (four) hours as needed for Wheezing or Shortness of Breath. Rescue 18 g 12    albuterol (PROVENTIL/VENTOLIN HFA) 90 mcg/actuation inhaler Inhale 1-2 puffs into the lungs every 6 (six) hours as needed for Wheezing or Shortness of Breath. Rescue 8 g 0    albuterol-ipratropium (DUO-NEB) 2.5 mg-0.5 mg/3 mL nebulizer solution Take 3 mLs by nebulization every 6 (six) hours as  needed for Wheezing or Shortness of Breath. Rescue 500 mL 12    fluticasone-umeclidin-vilanter (TRELEGY ELLIPTA) 200-62.5-25 mcg inhaler Inhale 1 puff into the lungs once daily. 90 each 12    GAVILYTE-C 240-22.72-6.72 -5.84 gram SolR Take by mouth.      MULTIVIT-IRON-MIN-FOLIC ACID 3,500-18-0.4 UNIT-MG-MG ORAL CHEW Take by mouth.      sodium chloride 3% 3 % nebulizer solution 4 cc of 3% saline into nebs twice daily prn 360 mL 3    sodium chloride 3% 3 % nebulizer solution 4 cc of 3% saline into nebs twice daily prn 360 mL 3     No current facility-administered medications for this visit.       Patient Care Team:  Kody Healy MD as PCP - General (Internal Medicine)  Alea Ramírez MA         - The patient is given an After Visit Summary that lists all medications with directions, allergies, education, orders placed during this encounter and follow-up instructions.      - I have reviewed the patient's medical information including past medical, family, and social history sections including the medications and allergies.      - We discussed the patient's current medications.     This note was created by combination of typed  and MModal dictation.  Transcription errors may be present.  If there are any questions, please contact me.       I offered to discuss advanced care planning, including how to pick a person who would make decisions for you if you were unable to make them for yourself, called a health care power of , and what kind of decisions you might make such as use of life sustaining treatments such as ventilators and tube feeding when faced with a life limiting illness recorded on a living will that they will need to know. (How you want to be cared for as you near the end of your natural life)     X Patient is interested in learning more about how to make advanced directives.  I provided them paperwork and offered to discuss this with them.

## 2024-09-05 ENCOUNTER — HOSPITAL ENCOUNTER (OUTPATIENT)
Dept: RADIOLOGY | Facility: HOSPITAL | Age: 69
Discharge: HOME | End: 2024-09-05
Payer: MEDICARE

## 2024-09-05 DIAGNOSIS — Z87.891 PERSONAL HISTORY OF NICOTINE DEPENDENCE: ICD-10-CM

## 2024-09-05 DIAGNOSIS — I70.0 AORTIC ATHEROSCLEROSIS: ICD-10-CM

## 2024-09-05 DIAGNOSIS — I71.43 INFRARENAL ABDOMINAL AORTIC ANEURYSM (AAA) WITHOUT RUPTURE: Primary | ICD-10-CM

## 2024-09-05 PROCEDURE — 76775 US EXAM ABDO BACK WALL LIM: CPT | Mod: 26,HCNC,, | Performed by: STUDENT IN AN ORGANIZED HEALTH CARE EDUCATION/TRAINING PROGRAM

## 2024-09-05 PROCEDURE — 76775 US EXAM ABDO BACK WALL LIM: CPT | Mod: TC,HCNC

## 2024-09-23 ENCOUNTER — TELEPHONE (OUTPATIENT)
Dept: VASCULAR SURGERY | Facility: CLINIC | Age: 69
End: 2024-09-23
Payer: MEDICARE

## 2024-09-24 ENCOUNTER — INITIAL CONSULT (OUTPATIENT)
Dept: VASCULAR SURGERY | Facility: CLINIC | Age: 69
End: 2024-09-24
Payer: MEDICARE

## 2024-09-24 VITALS
WEIGHT: 129.88 LBS | SYSTOLIC BLOOD PRESSURE: 125 MMHG | DIASTOLIC BLOOD PRESSURE: 87 MMHG | HEART RATE: 89 BPM | BODY MASS INDEX: 19.24 KG/M2 | HEIGHT: 69 IN

## 2024-09-24 DIAGNOSIS — I71.43 INFRARENAL ABDOMINAL AORTIC ANEURYSM (AAA) WITHOUT RUPTURE: Primary | ICD-10-CM

## 2024-09-24 PROCEDURE — 99999 PR PBB SHADOW E&M-EST. PATIENT-LVL IV: CPT | Mod: PBBFAC,HCNC,, | Performed by: SURGERY

## 2024-09-24 PROCEDURE — 99205 OFFICE O/P NEW HI 60 MIN: CPT | Mod: HCNC,S$GLB,, | Performed by: SURGERY

## 2024-09-24 RX ORDER — NAPROXEN SODIUM 220 MG/1
81 TABLET, FILM COATED ORAL DAILY
Qty: 90 TABLET | Refills: 3 | Status: SHIPPED | OUTPATIENT
Start: 2024-09-24 | End: 2025-09-24

## 2024-09-24 NOTE — PROGRESS NOTES
Romero Fabian MD, PhD  [facility]   09/24/2024    HPI:  Dieter Ayala is a 69 y.o. male with a history of abdominal aortic aneurysm (AAA), who was referred by Nadiya Mendes NP.    Very pleasant 69-year-old man with a history of smoking and COPD was found to have a abdominal aortic aneurysms on duplex screening.  He denies any back pains groin pains flank pains or abdominal pains    Patient Active Problem List   Diagnosis    Tobacco dependence    Elevated blood pressure (not hypertension)    Sensation of foreign body in eye    Essential hypertension    Foreign body of right eye    Aortic atherosclerosis    Centrilobular emphysema    Dyspnea on exertion    Other nonthrombocytopenic purpura     Past Medical History:   Diagnosis Date    COPD (chronic obstructive pulmonary disease)     Hypertension     Tobacco dependence 03/18/2016     Past Surgical History:   Procedure Laterality Date    ADENOIDECTOMY      APPENDECTOMY      TONSILLECTOMY       Family History   Problem Relation Name Age of Onset    Heart disease Mother      No Known Problems Father      No Known Problems Sister      ALS Brother      No Known Problems Maternal Aunt      No Known Problems Maternal Uncle      No Known Problems Paternal Aunt      No Known Problems Paternal Uncle      No Known Problems Maternal Grandmother      No Known Problems Maternal Grandfather      No Known Problems Paternal Grandmother      No Known Problems Paternal Grandfather       Social History     Socioeconomic History    Marital status:    Tobacco Use    Smoking status: Every Day     Current packs/day: 1.00     Average packs/day: 1 pack/day for 50.0 years (50.0 ttl pk-yrs)     Types: Cigarettes     Passive exposure: Past    Smokeless tobacco: Never   Substance and Sexual Activity    Alcohol use: Yes     Comment: rarely    Drug use: Never    Sexual activity: Yes     Social Determinants of Health     Financial Resource Strain: Low Risk  (8/27/2024)     Overall Financial Resource Strain (CARDIA)     Difficulty of Paying Living Expenses: Not hard at all   Food Insecurity: No Food Insecurity (8/27/2024)    Hunger Vital Sign     Worried About Running Out of Food in the Last Year: Never true     Ran Out of Food in the Last Year: Never true   Transportation Needs: No Transportation Needs (8/27/2024)    PRAPARE - Transportation     Lack of Transportation (Medical): No     Lack of Transportation (Non-Medical): No   Physical Activity: Insufficiently Active (8/27/2024)    Exercise Vital Sign     Days of Exercise per Week: 3 days     Minutes of Exercise per Session: 20 min   Stress: No Stress Concern Present (8/27/2024)    [employer] [place] [employer] - Occupational Stress Questionnaire     Feeling of Stress : Not at all   Housing Stability: Low Risk  (8/27/2024)    Housing Stability Vital Sign     Unable to Pay for Housing in the Last Year: No     Homeless in the Last Year: No       Current Outpatient Medications:     albuterol (ACCUNEB) 0.63 mg/3 mL Nebu, Take 3 mLs (0.63 mg total) by nebulization every 6 (six) hours as needed. Rescue, Disp: 300 each, Rfl: 12    albuterol (PROVENTIL/VENTOLIN HFA) 90 mcg/actuation inhaler, Inhale 2 puffs into the lungs every 4 (four) hours as needed for Wheezing or Shortness of Breath. Rescue, Disp: 18 g, Rfl: 12    albuterol (PROVENTIL/VENTOLIN HFA) 90 mcg/actuation inhaler, Inhale 1-2 puffs into the lungs every 6 (six) hours as needed for Wheezing or Shortness of Breath. Rescue, Disp: 8 g, Rfl: 0    albuterol-ipratropium (DUO-NEB) 2.5 mg-0.5 mg/3 mL nebulizer solution, Take 3 mLs by nebulization every 6 (six) hours as needed for Wheezing or Shortness of Breath. Rescue, Disp: 500 mL, Rfl: 12    fluticasone-umeclidin-vilanter (TRELEGY ELLIPTA) 200-62.5-25 mcg inhaler, Inhale 1 puff into the lungs once daily., Disp: 90 each, Rfl: 12    GAVILYTE-C 240-22.72-6.72 -5.84 gram SolR, Take by mouth., Disp: , Rfl:      "MULTIVIT-IRON-MIN-FOLIC ACID 3,500-18-0.4 UNIT-MG-MG ORAL CHEW, Take by mouth., Disp: , Rfl:     sodium chloride 3% 3 % nebulizer solution, 4 cc of 3% saline into nebs twice daily prn, Disp: 360 mL, Rfl: 3    sodium chloride 3% 3 % nebulizer solution, 4 cc of 3% saline into nebs twice daily prn, Disp: 360 mL, Rfl: 3    aspirin 81 MG Chew, Take 1 tablet (81 mg total) by mouth once daily., Disp: 90 tablet, Rfl: 3    REVIEW OF SYSTEMS:  ROS       VITALS:  Vitals:    09/24/24 0955   BP: 125/87   BP Location: Left arm   Patient Position: Sitting   Pulse: 89   Weight: 58.9 kg (129 lb 13.6 oz)   Height: 5' 9" (1.753 m)        PHYSICAL EXAM:   Physical Exam         LAB RESULTS:  No results found for: "CBC"  Lab Results   Component Value Date    LABPROT 10.9 08/23/2022    INR 1.1 08/23/2022     Lab Results   Component Value Date     08/27/2024    K 5.4 (H) 08/27/2024     08/27/2024    CO2 29 08/27/2024    GLU 87 08/27/2024    BUN 11 08/27/2024    CREATININE 1.1 08/27/2024    CALCIUM 9.8 08/27/2024    ANIONGAP 9 08/27/2024    EGFRNONAA >60.0 03/19/2016     Lab Results   Component Value Date    WBC 9.37 08/27/2024    RBC 5.38 08/27/2024    HGB 16.7 08/27/2024    HCT 50.9 08/27/2024    MCV 95 08/27/2024    MCH 31.0 08/27/2024    MCHC 32.8 08/27/2024    RDW 14.6 (H) 08/27/2024     08/27/2024    MPV 10.5 08/27/2024    GRAN 6.6 08/27/2024    GRAN 69.9 08/27/2024    LYMPH 1.8 08/27/2024    LYMPH 19.2 08/27/2024    MONO 0.7 08/27/2024    MONO 7.7 08/27/2024    EOS 0.2 08/27/2024    BASO 0.08 08/27/2024    EOSINOPHIL 1.9 08/27/2024    BASOPHIL 0.9 08/27/2024    DIFFMETHOD Automated 08/27/2024     .  Lab Results   Component Value Date    HGBA1C 5.6 03/19/2016       IMAGING:  All pertinent imaging has been reviewed and interpreted independently.  I reviewed his aortic duplex as well as CTA from 2023 and his CT non con study from October 1st.    IMP/PLAN:  Dieter CURRY Ayala is a 69 y.o. male smoking and COPD who was " incidentally found to have an abdominal aortic aneurysms measuring up to 3.8 cm on aortic duplex.  Asymptomatic.  No evidence of thoracic aneurysm on CTA from 2023     Plan:    Obtain CT non con abdomen pelvis to better evaluate aneurysms size  Return to clinic in 6 months with repeat aortic ultrasound  Start baby aspirin daily    Addendum 10/08/2024 - I reviewed his CT abdomen and pelvis without contrast from October 1, 2024 which demonstrates infrarenal abdominal aortic aneurysms measuring up to 3.3 cm as well as a left common iliac artery aneurysms measuring up to 2.9 cm.  Does not yet meet size threshold for repair.  Continue plan as stated above      I spent 4 minutes evaluating this patient and greater than 50% of the time was spent counseling, coordinator care and discussing the plan of care.  All questions were answered and patient stated understanding with agreement with the above treatment plan.    Romero Fabian MD, PhD  [facility]

## 2024-10-01 ENCOUNTER — HOSPITAL ENCOUNTER (OUTPATIENT)
Dept: RADIOLOGY | Facility: HOSPITAL | Age: 69
Discharge: HOME | End: 2024-10-01
Attending: SURGERY
Payer: MEDICARE

## 2024-10-01 DIAGNOSIS — I71.43 INFRARENAL ABDOMINAL AORTIC ANEURYSM (AAA) WITHOUT RUPTURE: ICD-10-CM

## 2024-10-01 PROCEDURE — 74176 CT ABD & PELVIS W/O CONTRAST: CPT | Mod: TC,HCNC

## 2024-10-01 PROCEDURE — 74176 CT ABD & PELVIS W/O CONTRAST: CPT | Mod: 26,HCNC,, | Performed by: RADIOLOGY

## 2024-10-08 ENCOUNTER — CLINICAL SUPPORT (OUTPATIENT)
Dept: SMOKING CESSATION | Facility: CLINIC | Age: 69
End: 2024-10-08

## 2024-10-08 DIAGNOSIS — F17.200 NICOTINE DEPENDENCE: Primary | ICD-10-CM

## 2024-10-08 PROCEDURE — 99999 PR PBB SHADOW E&M-EST. PATIENT-LVL II: CPT | Mod: PBBFAC,,,

## 2024-10-08 RX ORDER — VARENICLINE TARTRATE 0.5 (11)-1
KIT ORAL
Qty: 53 EACH | Refills: 0 | Status: SHIPPED | OUTPATIENT
Start: 2024-10-08 | End: 2024-11-08

## 2024-10-08 NOTE — Clinical Note
Patient presents for intake smoking 1 pk per day, after assessment and discussion recommend the chantix starter pk, explained how chantix works, explained how to take and what to expect, he has medicare and is not in the trust therefore can not afford nrt at this time, this is why chantix was first choice, he also has copd and needs to try and quit asap, discussed strategies to help get him started and get him cutting back and working on will power encouragement provided will follow

## 2024-10-15 ENCOUNTER — TELEPHONE (OUTPATIENT)
Dept: ENDOSCOPY | Facility: HOSPITAL | Age: 69
End: 2024-10-15
Payer: MEDICARE

## 2024-10-15 NOTE — TELEPHONE ENCOUNTER
Left voicemail and sent portal message for patient to call Endoscopy Scheduling to review instructions and confirm appointment for Colonoscopy on 10/22/24.

## 2024-10-16 ENCOUNTER — ANESTHESIA EVENT (OUTPATIENT)
Dept: ENDOSCOPY | Facility: HOSPITAL | Age: 69
End: 2024-10-16
Payer: MEDICARE

## 2024-10-22 ENCOUNTER — ANESTHESIA (OUTPATIENT)
Dept: ENDOSCOPY | Facility: HOSPITAL | Age: 69
End: 2024-10-22
Payer: MEDICARE

## 2024-10-22 ENCOUNTER — HOSPITAL ENCOUNTER (OUTPATIENT)
Facility: HOSPITAL | Age: 69
Discharge: HOME | End: 2024-10-22
Attending: STUDENT IN AN ORGANIZED HEALTH CARE EDUCATION/TRAINING PROGRAM | Admitting: STUDENT IN AN ORGANIZED HEALTH CARE EDUCATION/TRAINING PROGRAM
Payer: MEDICARE

## 2024-10-22 VITALS
WEIGHT: 130 LBS | RESPIRATION RATE: 20 BRPM | HEART RATE: 66 BPM | TEMPERATURE: 97 F | DIASTOLIC BLOOD PRESSURE: 93 MMHG | OXYGEN SATURATION: 97 % | SYSTOLIC BLOOD PRESSURE: 148 MMHG | HEIGHT: 69 IN | BODY MASS INDEX: 19.26 KG/M2

## 2024-10-22 DIAGNOSIS — Z12.11 ENCOUNTER FOR SCREENING COLONOSCOPY: ICD-10-CM

## 2024-10-22 PROCEDURE — 63600175 PHARM REV CODE 636 W HCPCS: Mod: HCNC

## 2024-10-22 PROCEDURE — 37000009 HC ANESTHESIA EA ADD 15 MINS: Mod: HCNC | Performed by: STUDENT IN AN ORGANIZED HEALTH CARE EDUCATION/TRAINING PROGRAM

## 2024-10-22 PROCEDURE — 45385 COLONOSCOPY W/LESION REMOVAL: CPT | Mod: PT,HCNC | Performed by: STUDENT IN AN ORGANIZED HEALTH CARE EDUCATION/TRAINING PROGRAM

## 2024-10-22 PROCEDURE — 27201012 HC FORCEPS, HOT/COLD, DISP: Mod: HCNC | Performed by: STUDENT IN AN ORGANIZED HEALTH CARE EDUCATION/TRAINING PROGRAM

## 2024-10-22 PROCEDURE — D9220A PRA ANESTHESIA: Mod: PT,ANES,, | Performed by: STUDENT IN AN ORGANIZED HEALTH CARE EDUCATION/TRAINING PROGRAM

## 2024-10-22 PROCEDURE — 45385 COLONOSCOPY W/LESION REMOVAL: CPT | Mod: PT,HCNC,, | Performed by: STUDENT IN AN ORGANIZED HEALTH CARE EDUCATION/TRAINING PROGRAM

## 2024-10-22 PROCEDURE — D9220A PRA ANESTHESIA: Mod: PT,CRNA,,

## 2024-10-22 PROCEDURE — 25000003 PHARM REV CODE 250: Mod: HCNC

## 2024-10-22 PROCEDURE — 45380 COLONOSCOPY AND BIOPSY: CPT | Mod: 59,PT,HCNC | Performed by: STUDENT IN AN ORGANIZED HEALTH CARE EDUCATION/TRAINING PROGRAM

## 2024-10-22 PROCEDURE — 37000008 HC ANESTHESIA 1ST 15 MINUTES: Mod: HCNC | Performed by: STUDENT IN AN ORGANIZED HEALTH CARE EDUCATION/TRAINING PROGRAM

## 2024-10-22 PROCEDURE — 88305 TISSUE EXAM BY PATHOLOGIST: CPT | Mod: 26,HCNC,, | Performed by: PATHOLOGY

## 2024-10-22 PROCEDURE — 27201089 HC SNARE, DISP (ANY): Mod: HCNC | Performed by: STUDENT IN AN ORGANIZED HEALTH CARE EDUCATION/TRAINING PROGRAM

## 2024-10-22 PROCEDURE — 45380 COLONOSCOPY AND BIOPSY: CPT | Mod: 59,PT,HCNC, | Performed by: STUDENT IN AN ORGANIZED HEALTH CARE EDUCATION/TRAINING PROGRAM

## 2024-10-22 PROCEDURE — 88305 TISSUE EXAM BY PATHOLOGIST: CPT | Mod: HCNC | Performed by: PATHOLOGY

## 2024-10-22 RX ORDER — PROPOFOL 10 MG/ML
VIAL (ML) INTRAVENOUS
Status: DISCONTINUED | OUTPATIENT
Start: 2024-10-22 | End: 2024-10-22

## 2024-10-22 RX ORDER — PROPOFOL 10 MG/ML
VIAL (ML) INTRAVENOUS
Status: DISCONTINUED
Start: 2024-10-22 | End: 2024-10-22 | Stop reason: HOSPADM

## 2024-10-22 RX ORDER — SODIUM CHLORIDE 0.9 % (FLUSH) 0.9 %
10 SYRINGE (ML) INJECTION ONCE
Status: DISCONTINUED | OUTPATIENT
Start: 2024-10-22 | End: 2024-10-22 | Stop reason: HOSPADM

## 2024-10-22 RX ORDER — LIDOCAINE HYDROCHLORIDE 20 MG/ML
INJECTION INTRAVENOUS
Status: DISCONTINUED | OUTPATIENT
Start: 2024-10-22 | End: 2024-10-22

## 2024-10-22 RX ORDER — LIDOCAINE HYDROCHLORIDE 10 MG/ML
1 INJECTION, SOLUTION EPIDURAL; INFILTRATION; INTRACAUDAL; PERINEURAL ONCE
Status: DISCONTINUED | OUTPATIENT
Start: 2024-10-22 | End: 2024-10-22 | Stop reason: HOSPADM

## 2024-10-22 RX ORDER — LIDOCAINE HYDROCHLORIDE 20 MG/ML
INJECTION, SOLUTION EPIDURAL; INFILTRATION; INTRACAUDAL; PERINEURAL
Status: DISCONTINUED
Start: 2024-10-22 | End: 2024-10-22 | Stop reason: HOSPADM

## 2024-10-22 RX ADMIN — PROPOFOL 20 MG: 10 INJECTION, EMULSION INTRAVENOUS at 09:10

## 2024-10-22 RX ADMIN — PROPOFOL 40 MG: 10 INJECTION, EMULSION INTRAVENOUS at 09:10

## 2024-10-22 RX ADMIN — SODIUM CHLORIDE: 0.9 INJECTION, SOLUTION INTRAVENOUS at 09:10

## 2024-10-22 RX ADMIN — LIDOCAINE HYDROCHLORIDE 60 MG: 20 INJECTION, SOLUTION INTRAVENOUS at 09:10

## 2024-10-22 RX ADMIN — PROPOFOL 30 MG: 10 INJECTION, EMULSION INTRAVENOUS at 10:10

## 2024-10-22 RX ADMIN — PROPOFOL 40 MG: 10 INJECTION, EMULSION INTRAVENOUS at 10:10

## 2024-10-22 RX ADMIN — SODIUM CHLORIDE: 0.9 INJECTION, SOLUTION INTRAVENOUS at 10:10

## 2024-10-22 RX ADMIN — PROPOFOL 80 MG: 10 INJECTION, EMULSION INTRAVENOUS at 09:10

## 2024-10-22 RX ADMIN — PROPOFOL 60 MG: 10 INJECTION, EMULSION INTRAVENOUS at 10:10

## 2024-10-23 ENCOUNTER — CLINICAL SUPPORT (OUTPATIENT)
Dept: SMOKING CESSATION | Facility: CLINIC | Age: 69
End: 2024-10-23

## 2024-10-23 DIAGNOSIS — F17.200 NICOTINE DEPENDENCE: Primary | ICD-10-CM

## 2024-10-23 PROCEDURE — 99407 BEHAV CHNG SMOKING > 10 MIN: CPT | Mod: ,,,

## 2024-10-23 PROCEDURE — 99999 PR PBB SHADOW E&M-EST. PATIENT-LVL I: CPT | Mod: PBBFAC,,,

## 2024-10-25 LAB
FINAL PATHOLOGIC DIAGNOSIS: NORMAL
GROSS: NORMAL
Lab: NORMAL

## 2024-10-28 ENCOUNTER — CLINICAL SUPPORT (OUTPATIENT)
Dept: SMOKING CESSATION | Facility: CLINIC | Age: 69
End: 2024-10-28

## 2024-10-28 DIAGNOSIS — F17.200 NICOTINE DEPENDENCE: Primary | ICD-10-CM

## 2024-10-28 PROCEDURE — 99407 BEHAV CHNG SMOKING > 10 MIN: CPT | Mod: ,,,

## 2024-10-28 PROCEDURE — 99999 PR PBB SHADOW E&M-EST. PATIENT-LVL I: CPT | Mod: PBBFAC,,,

## 2024-10-29 ENCOUNTER — CLINICAL SUPPORT (OUTPATIENT)
Dept: SMOKING CESSATION | Facility: CLINIC | Age: 69
End: 2024-10-29

## 2024-10-29 DIAGNOSIS — F17.200 NICOTINE DEPENDENCE: Primary | ICD-10-CM

## 2024-10-29 PROCEDURE — 99999 PR PBB SHADOW E&M-EST. PATIENT-LVL II: CPT | Mod: PBBFAC,,,

## 2024-10-29 PROCEDURE — 99404 PREV MED CNSL INDIV APPRX 60: CPT | Mod: ,,,

## 2024-10-29 RX ORDER — VARENICLINE TARTRATE 1 MG/1
1 TABLET, FILM COATED ORAL 2 TIMES DAILY
Qty: 60 TABLET | Refills: 0 | Status: SHIPPED | OUTPATIENT
Start: 2024-10-29 | End: 2024-11-30

## 2024-11-01 RX ORDER — FLUTICASONE FUROATE, UMECLIDINIUM BROMIDE AND VILANTEROL TRIFENATATE 200; 62.5; 25 UG/1; UG/1; UG/1
1 POWDER RESPIRATORY (INHALATION)
Qty: 180 EACH | Refills: 3 | Status: SHIPPED | OUTPATIENT
Start: 2024-11-01

## 2024-11-01 RX ORDER — FLUTICASONE FUROATE, UMECLIDINIUM BROMIDE AND VILANTEROL TRIFENATATE 200; 62.5; 25 UG/1; UG/1; UG/1
1 POWDER RESPIRATORY (INHALATION)
Qty: 60 EACH | OUTPATIENT
Start: 2024-11-01

## 2024-11-12 ENCOUNTER — CLINICAL SUPPORT (OUTPATIENT)
Dept: SMOKING CESSATION | Facility: CLINIC | Age: 69
End: 2024-11-12

## 2024-11-12 DIAGNOSIS — F17.200 NICOTINE DEPENDENCE: Primary | ICD-10-CM

## 2024-11-12 PROCEDURE — 99999 PR PBB SHADOW E&M-EST. PATIENT-LVL II: CPT | Mod: PBBFAC,,,

## 2024-11-12 PROCEDURE — 99403 PREV MED CNSL INDIV APPRX 45: CPT | Mod: ,,,

## 2024-11-12 NOTE — PROGRESS NOTES
Individual Follow-Up Form    11/12/2024    Quit Date: 11/5    Clinical Status of Patient: Outpatient    Length of Service: 45 minutes    Continuing Medication: yes  Chantix    Other Medications: n/a     Target Symptoms: Withdrawal and medication side effects. The following were  rated moderate (3) to severe (4) on TCRS:  Moderate (3): 0  Severe (4): 0    Comments: patient presents for follow up telephonically, he did not show for his clinic appt therefore ctts contacted him, he is not smoking right now, he quit smoking on 11/5 and has not had any slips, he is taking chantix on and off and states that the cigarettes make him sick and he hates the smell of them, he also mentioned that he is getting some productive brown sputum, explained to him that this is common when someone quits smoking, this happens at times and its his lungs clearing,  he is motivated to remain a non smoker, he denies negative s/e from the chantix, strategies and session handout discussed, will follow and monitor.    Diagnosis: F17.200    Next Visit: 2 weeks

## 2024-12-11 ENCOUNTER — CLINICAL SUPPORT (OUTPATIENT)
Dept: SMOKING CESSATION | Facility: CLINIC | Age: 69
End: 2024-12-11

## 2024-12-11 DIAGNOSIS — F17.200 NICOTINE DEPENDENCE: Primary | ICD-10-CM

## 2024-12-11 PROCEDURE — 99999 PR PBB SHADOW E&M-EST. PATIENT-LVL I: CPT | Mod: PBBFAC,,,

## 2024-12-11 NOTE — PROGRESS NOTES
"Individual Follow-Up Form    12/11/2024    Quit Date: quit smoking in November     Clinical Status of Patient: Outpatient    Length of Service: 45 minutes    Continuing Medication: no    Other Medications: n/a     Target Symptoms: Withdrawal and medication side effects. The following were  rated moderate (3) to severe (4) on TCRS:  Moderate (3): 0  Severe (4): 0    Comments: patient presents for follow up telephonically today, also spoke with his wife, the patient continues to be a non smoker and is feeling and doing great! Recommend he continue to remain a non smoker and continue the chantix as he has it and can take twice daily 1mg if he would liek to continue, he is not taking right now and has it as a :back up" he states therefore recommend contacting ctts to further discuss his chantix and the plan for dosing if he should want to restart, he has not had any slips or lapses and is motivated to continue, will follow and monitor prn     Diagnosis: F17.200    Next Visit: 2 weeks    "

## 2025-01-08 ENCOUNTER — TELEPHONE (OUTPATIENT)
Dept: SMOKING CESSATION | Facility: CLINIC | Age: 70
End: 2025-01-08
Payer: MEDICARE

## 2025-02-17 ENCOUNTER — TELEPHONE (OUTPATIENT)
Dept: SMOKING CESSATION | Facility: CLINIC | Age: 70
End: 2025-02-17
Payer: MEDICARE

## 2025-02-24 DIAGNOSIS — Z00.00 ENCOUNTER FOR MEDICARE ANNUAL WELLNESS EXAM: ICD-10-CM

## 2025-03-15 NOTE — TELEPHONE ENCOUNTER
No care due was identified.  [facility] Embedded Care Due Messages. Reference number: [id number].   3/15/2025 6:09:14 PM CDT

## 2025-03-17 RX ORDER — ALBUTEROL SULFATE 90 UG/1
2 INHALANT RESPIRATORY (INHALATION) EVERY 4 HOURS PRN
Qty: 18 G | Refills: 12 | Status: SHIPPED | OUTPATIENT
Start: 2025-03-17

## 2025-03-24 ENCOUNTER — OFFICE VISIT (OUTPATIENT)
Dept: VASCULAR SURGERY | Facility: CLINIC | Age: 70
End: 2025-03-24
Payer: MEDICARE

## 2025-03-24 VITALS
SYSTOLIC BLOOD PRESSURE: 155 MMHG | DIASTOLIC BLOOD PRESSURE: 104 MMHG | HEART RATE: 82 BPM | HEIGHT: 69 IN | BODY MASS INDEX: 21.52 KG/M2 | WEIGHT: 145.31 LBS

## 2025-03-24 DIAGNOSIS — Z01.818 PRE-OP EVALUATION: Primary | ICD-10-CM

## 2025-03-24 DIAGNOSIS — I71.43 INFRARENAL ABDOMINAL AORTIC ANEURYSM (AAA) WITHOUT RUPTURE: Primary | ICD-10-CM

## 2025-03-24 DIAGNOSIS — Z01.818 PRE-OP TESTING: ICD-10-CM

## 2025-03-24 PROCEDURE — 3288F FALL RISK ASSESSMENT DOCD: CPT | Mod: HCNC,CPTII,S$GLB, | Performed by: SURGERY

## 2025-03-24 PROCEDURE — 1159F MED LIST DOCD IN RCRD: CPT | Mod: HCNC,CPTII,S$GLB, | Performed by: SURGERY

## 2025-03-24 PROCEDURE — 3008F BODY MASS INDEX DOCD: CPT | Mod: HCNC,CPTII,S$GLB, | Performed by: SURGERY

## 2025-03-24 PROCEDURE — 1101F PT FALLS ASSESS-DOCD LE1/YR: CPT | Mod: HCNC,CPTII,S$GLB, | Performed by: SURGERY

## 2025-03-24 PROCEDURE — 99999 PR PBB SHADOW E&M-EST. PATIENT-LVL III: CPT | Mod: PBBFAC,HCNC,, | Performed by: SURGERY

## 2025-03-24 PROCEDURE — 3080F DIAST BP >= 90 MM HG: CPT | Mod: HCNC,CPTII,S$GLB, | Performed by: SURGERY

## 2025-03-24 PROCEDURE — 99215 OFFICE O/P EST HI 40 MIN: CPT | Mod: HCNC,S$GLB,, | Performed by: SURGERY

## 2025-03-24 PROCEDURE — 3077F SYST BP >= 140 MM HG: CPT | Mod: HCNC,CPTII,S$GLB, | Performed by: SURGERY

## 2025-03-24 PROCEDURE — 1126F AMNT PAIN NOTED NONE PRSNT: CPT | Mod: HCNC,CPTII,S$GLB, | Performed by: SURGERY

## 2025-03-24 NOTE — PROGRESS NOTES
Romero Fabian MD, PhD  [facility]   03/24/2025    HPI:  Dieter Ayala is a 69 y.o. male with a history of abdominal aortic aneurysm (AAA), who was referred by Nadiya Mendes NP.    Very pleasant 69-year-old man with a history of smoking and COPD was found to have a abdominal aortic aneurysms on duplex screening.  He denies any back pains groin pains flank pains or abdominal pains    3/2425 - here for follow-up.  No changes.  No studies available for review    Patient Active Problem List   Diagnosis    Tobacco dependence    Elevated blood pressure (not hypertension)    Sensation of foreign body in eye    Essential hypertension    Foreign body of right eye    Aortic atherosclerosis    Centrilobular emphysema    Dyspnea on exertion    Other nonthrombocytopenic purpura     Past Medical History:   Diagnosis Date    COPD (chronic obstructive pulmonary disease)     Hypertension     Tobacco dependence 03/18/2016     Past Surgical History:   Procedure Laterality Date    ADENOIDECTOMY      APPENDECTOMY      COLONOSCOPY N/A 10/22/2024    Procedure: COLONOSCOPY;  Surgeon: Anthony Tinajero MD;  Location: [facility];  Service: Endoscopy;  Laterality: N/A;  8/21 ref by JUAN PETERS, ART, instr. to portal, pulmonary clearance pending-st  per Dr Yancey-Patient is optimized from pulmonary perspective for colonoscopy-GT  10/14 moved back 15 min - LW  10/15/24- lvm/portal for pc. DBM    TONSILLECTOMY       Family History   Problem Relation Name Age of Onset    Heart disease Mother      No Known Problems Father      No Known Problems Sister      ALS Brother      No Known Problems Maternal Aunt      No Known Problems Maternal Uncle      No Known Problems Paternal Aunt      No Known Problems Paternal Uncle      No Known Problems Maternal Grandmother      No Known Problems Maternal Grandfather      No Known Problems Paternal Grandmother      No Known Problems Paternal Grandfather       Social History     Socioeconomic  History    Marital status:    Tobacco Use    Smoking status: Former     Current packs/day: 1.00     Average packs/day: 1 pack/day for 50.0 years (50.0 ttl pk-yrs)     Types: Cigarettes     Passive exposure: Past    Smokeless tobacco: Never    Tobacco comments:     Pt currently taking chantix for ceassation   Substance and Sexual Activity    Alcohol use: Yes     Comment: rarely    Drug use: Never    Sexual activity: Yes     Social Drivers of Health     Financial Resource Strain: Low Risk  (8/27/2024)    Overall Financial Resource Strain (CARDIA)     Difficulty of Paying Living Expenses: Not hard at all   Food Insecurity: No Food Insecurity (8/27/2024)    Hunger Vital Sign     Worried About Running Out of Food in the Last Year: Never true     Ran Out of Food in the Last Year: Never true   Transportation Needs: No Transportation Needs (8/27/2024)    PRAPARE - Transportation     Lack of Transportation (Medical): No     Lack of Transportation (Non-Medical): No   Physical Activity: Insufficiently Active (8/27/2024)    Exercise Vital Sign     Days of Exercise per Week: 3 days     Minutes of Exercise per Session: 20 min   Stress: No Stress Concern Present (8/27/2024)    [employer] [place] [employer] - Occupational Stress Questionnaire     Feeling of Stress : Not at all   Housing Stability: Unknown (8/27/2024)    Housing Stability Vital Sign     Unable to Pay for Housing in the Last Year: No     Homeless in the Last Year: No       Current Outpatient Medications:     albuterol (ACCUNEB) 0.63 mg/3 mL Nebu, Take 3 mLs (0.63 mg total) by nebulization every 6 (six) hours as needed. Rescue, Disp: 300 each, Rfl: 12    albuterol (PROVENTIL/VENTOLIN HFA) 90 mcg/actuation inhaler, Inhale 1-2 puffs into the lungs every 6 (six) hours as needed for Wheezing or Shortness of Breath. Rescue, Disp: 8 g, Rfl: 0    albuterol (PROVENTIL/VENTOLIN HFA) 90 mcg/actuation inhaler, Inhale 2 puffs into the lungs every 4 (four) hours  "as needed for Wheezing or Shortness of Breath. Rescue, Disp: 18 g, Rfl: 12    albuterol-ipratropium (DUO-NEB) 2.5 mg-0.5 mg/3 mL nebulizer solution, Take 3 mLs by nebulization every 6 (six) hours as needed for Wheezing or Shortness of Breath. Rescue, Disp: 500 mL, Rfl: 12    aspirin 81 MG Chew, Take 1 tablet (81 mg total) by mouth once daily., Disp: 90 tablet, Rfl: 3    GAVILYTE-C 240-22.72-6.72 -5.84 gram SolR, Take by mouth., Disp: , Rfl:     MULTIVIT-IRON-MIN-FOLIC ACID 3,500-18-0.4 UNIT-MG-MG ORAL CHEW, Take by mouth., Disp: , Rfl:     sodium chloride 3% 3 % nebulizer solution, 4 cc of 3% saline into nebs twice daily prn, Disp: 360 mL, Rfl: 3    sodium chloride 3% 3 % nebulizer solution, 4 cc of 3% saline into nebs twice daily prn, Disp: 360 mL, Rfl: 3    TRELEGY ELLIPTA 200-62.5-25 mcg inhaler, INHALE 1 PUFF INTO THE LUNGS EVERY DAY, Disp: 180 each, Rfl: 3    REVIEW OF SYSTEMS:  ROS       VITALS:  Vitals:    03/24/25 1307   BP: (!) 155/104   BP Location: Right arm   Patient Position: Sitting   Pulse: 82   Weight: 65.9 kg (145 lb 4.5 oz)   Height: 5' 9" (1.753 m)        PHYSICAL EXAM:   Physical Exam         LAB RESULTS:  No results found for: "CBC"  Lab Results   Component Value Date    LABPROT 10.9 08/23/2022    INR 1.1 08/23/2022     Lab Results   Component Value Date     08/27/2024    K 5.4 (H) 08/27/2024     08/27/2024    CO2 29 08/27/2024    GLU 87 08/27/2024    BUN 11 08/27/2024    CREATININE 1.1 08/27/2024    CALCIUM 9.8 08/27/2024    ANIONGAP 9 08/27/2024    EGFRNONAA >60.0 03/19/2016     Lab Results   Component Value Date    WBC 9.37 08/27/2024    RBC 5.38 08/27/2024    HGB 16.7 08/27/2024    HCT 50.9 08/27/2024    MCV 95 08/27/2024    MCH 31.0 08/27/2024    MCHC 32.8 08/27/2024    RDW 14.6 (H) 08/27/2024     08/27/2024    MPV 10.5 08/27/2024    GRAN 6.6 08/27/2024    GRAN 69.9 08/27/2024    LYMPH 1.8 08/27/2024    LYMPH 19.2 08/27/2024    MONO 0.7 08/27/2024    MONO 7.7 08/27/2024 "    EOS 0.2 08/27/2024    BASO 0.08 08/27/2024    EOSINOPHIL 1.9 08/27/2024    BASOPHIL 0.9 08/27/2024    DIFFMETHOD Automated 08/27/2024     .  Lab Results   Component Value Date    HGBA1C 5.6 03/19/2016       IMAGING:  All pertinent imaging has been reviewed and interpreted independently.  I reviewed his aortic duplex as well as CTA from 2023 and his CT non con study from October 1st.  10/08/2024 - I reviewed his CT abdomen and pelvis without contrast from October 1, 2024 which demonstrates infrarenal abdominal aortic aneurysms measuring up to 3.3 cm as well as a left common iliac artery aneurysms measuring up to 2.9 cm.  Does not yet meet size threshold for repair.      IMP/PLAN:  Dieter Ayala is a 69 y.o. male smoking and COPD who was incidentally found to have an abdominal aortic aneurysms measuring up to 3.8 cm on aortic duplex.  Asymptomatic.  No evidence of thoracic aneurysm on CTA from 2023 .  Patient is here for follow up but unfortunately does not have any studies ordered.    Plan:    - obtain CTA abdomen pelvis and follow up in 1-2 weeks to discuss results  - Cont ASA      I spent 4 minutes evaluating this patient and greater than 50% of the time was spent counseling, coordinator care and discussing the plan of care.  All questions were answered and patient stated understanding with agreement with the above treatment plan.    Romero Fabian MD, PhD  [facility]

## 2025-04-02 ENCOUNTER — HOSPITAL ENCOUNTER (OUTPATIENT)
Dept: RADIOLOGY | Facility: HOSPITAL | Age: 70
Discharge: HOME | End: 2025-04-02
Attending: SURGERY
Payer: MEDICARE

## 2025-04-02 ENCOUNTER — OFFICE VISIT (OUTPATIENT)
Dept: VASCULAR SURGERY | Facility: CLINIC | Age: 70
End: 2025-04-02
Payer: MEDICARE

## 2025-04-02 VITALS
HEART RATE: 68 BPM | WEIGHT: 142.06 LBS | HEIGHT: 69 IN | BODY MASS INDEX: 21.04 KG/M2 | DIASTOLIC BLOOD PRESSURE: 68 MMHG | SYSTOLIC BLOOD PRESSURE: 155 MMHG

## 2025-04-02 DIAGNOSIS — I71.43 INFRARENAL ABDOMINAL AORTIC ANEURYSM (AAA) WITHOUT RUPTURE: ICD-10-CM

## 2025-04-02 DIAGNOSIS — I71.43 INFRARENAL ABDOMINAL AORTIC ANEURYSM (AAA) WITHOUT RUPTURE: Primary | ICD-10-CM

## 2025-04-02 PROCEDURE — 25500020 PHARM REV CODE 255: Mod: HCNC | Performed by: SURGERY

## 2025-04-02 PROCEDURE — 99215 OFFICE O/P EST HI 40 MIN: CPT | Mod: HCNC,S$GLB,, | Performed by: SURGERY

## 2025-04-02 PROCEDURE — 1159F MED LIST DOCD IN RCRD: CPT | Mod: HCNC,CPTII,S$GLB, | Performed by: SURGERY

## 2025-04-02 PROCEDURE — 1101F PT FALLS ASSESS-DOCD LE1/YR: CPT | Mod: HCNC,CPTII,S$GLB, | Performed by: SURGERY

## 2025-04-02 PROCEDURE — 74174 CTA ABD&PLVS W/CONTRAST: CPT | Mod: TC,HCNC

## 2025-04-02 PROCEDURE — 3078F DIAST BP <80 MM HG: CPT | Mod: HCNC,CPTII,S$GLB, | Performed by: SURGERY

## 2025-04-02 PROCEDURE — 99999 PR PBB SHADOW E&M-EST. PATIENT-LVL III: CPT | Mod: PBBFAC,HCNC,, | Performed by: SURGERY

## 2025-04-02 PROCEDURE — 3008F BODY MASS INDEX DOCD: CPT | Mod: HCNC,CPTII,S$GLB, | Performed by: SURGERY

## 2025-04-02 PROCEDURE — 1126F AMNT PAIN NOTED NONE PRSNT: CPT | Mod: HCNC,CPTII,S$GLB, | Performed by: SURGERY

## 2025-04-02 PROCEDURE — 3288F FALL RISK ASSESSMENT DOCD: CPT | Mod: HCNC,CPTII,S$GLB, | Performed by: SURGERY

## 2025-04-02 PROCEDURE — 3077F SYST BP >= 140 MM HG: CPT | Mod: HCNC,CPTII,S$GLB, | Performed by: SURGERY

## 2025-04-02 PROCEDURE — 74174 CTA ABD&PLVS W/CONTRAST: CPT | Mod: 26,HCNC,, | Performed by: INTERNAL MEDICINE

## 2025-04-02 RX ADMIN — IOHEXOL 75 ML: 350 INJECTION, SOLUTION INTRAVENOUS at 10:04

## 2025-04-02 NOTE — PROGRESS NOTES
Romero Fabian MD, PhD  [facility]   04/02/2025    HPI:  Dieter Ayala is a 69 y.o. male with a history of abdominal aortic aneurysm (AAA), who was referred by Nadiya Mendes NP.    Very pleasant 69-year-old man with a history of smoking and COPD was found to have a abdominal aortic aneurysms on duplex screening.  He denies any back pains groin pains flank pains or abdominal pains    3/2425 - here for follow-up.  No changes.  No studies available for review    April 2, 2025 patient here for follow-up with CTA.  Remains asymptomatic.  Patient continues to abstain from smoking    Patient Active Problem List   Diagnosis    Tobacco dependence    Elevated blood pressure (not hypertension)    Sensation of foreign body in eye    Essential hypertension    Foreign body of right eye    Aortic atherosclerosis    Centrilobular emphysema    Dyspnea on exertion    Other nonthrombocytopenic purpura     Past Medical History:   Diagnosis Date    COPD (chronic obstructive pulmonary disease)     Hypertension     Tobacco dependence 03/18/2016     Past Surgical History:   Procedure Laterality Date    ADENOIDECTOMY      APPENDECTOMY      COLONOSCOPY N/A 10/22/2024    Procedure: COLONOSCOPY;  Surgeon: Anthony Tinajero MD;  Location: [facility];  Service: Endoscopy;  Laterality: N/A;  8/21 ref by JUAN PETERS, ART, instr. to portal, pulmonary clearance pending-st  per Dr Yancey-Patient is optimized from pulmonary perspective for colonoscopy-GT  10/14 moved back 15 min - LW  10/15/24- lvm/portal for pc. DBM    TONSILLECTOMY       Family History   Problem Relation Name Age of Onset    Heart disease Mother      No Known Problems Father      No Known Problems Sister      ALS Brother      No Known Problems Maternal Aunt      No Known Problems Maternal Uncle      No Known Problems Paternal Aunt      No Known Problems Paternal Uncle      No Known Problems Maternal Grandmother      No Known Problems Maternal Grandfather       No Known Problems Paternal Grandmother      No Known Problems Paternal Grandfather       Social History     Socioeconomic History    Marital status:    Tobacco Use    Smoking status: Former     Current packs/day: 1.00     Average packs/day: 1 pack/day for 50.0 years (50.0 ttl pk-yrs)     Types: Cigarettes     Passive exposure: Past    Smokeless tobacco: Never    Tobacco comments:     Pt currently taking chantix for ceassation   Substance and Sexual Activity    Alcohol use: Yes     Comment: rarely    Drug use: Never    Sexual activity: Yes     Social Drivers of Health     Financial Resource Strain: Low Risk  (8/27/2024)    Overall Financial Resource Strain (CARDIA)     Difficulty of Paying Living Expenses: Not hard at all   Food Insecurity: No Food Insecurity (8/27/2024)    Hunger Vital Sign     Worried About Running Out of Food in the Last Year: Never true     Ran Out of Food in the Last Year: Never true   Transportation Needs: No Transportation Needs (8/27/2024)    PRAPARE - Transportation     Lack of Transportation (Medical): No     Lack of Transportation (Non-Medical): No   Physical Activity: Insufficiently Active (8/27/2024)    Exercise Vital Sign     Days of Exercise per Week: 3 days     Minutes of Exercise per Session: 20 min   Stress: No Stress Concern Present (8/27/2024)    [employer] [place] [employer] - Occupational Stress Questionnaire     Feeling of Stress : Not at all   Housing Stability: Unknown (8/27/2024)    Housing Stability Vital Sign     Unable to Pay for Housing in the Last Year: No     Homeless in the Last Year: No       Current Outpatient Medications:     albuterol (ACCUNEB) 0.63 mg/3 mL Nebu, Take 3 mLs (0.63 mg total) by nebulization every 6 (six) hours as needed. Rescue, Disp: 300 each, Rfl: 12    albuterol (PROVENTIL/VENTOLIN HFA) 90 mcg/actuation inhaler, Inhale 1-2 puffs into the lungs every 6 (six) hours as needed for Wheezing or Shortness of Breath. Rescue, Disp: 8 g,  "Rfl: 0    albuterol (PROVENTIL/VENTOLIN HFA) 90 mcg/actuation inhaler, Inhale 2 puffs into the lungs every 4 (four) hours as needed for Wheezing or Shortness of Breath. Rescue, Disp: 18 g, Rfl: 12    albuterol-ipratropium (DUO-NEB) 2.5 mg-0.5 mg/3 mL nebulizer solution, Take 3 mLs by nebulization every 6 (six) hours as needed for Wheezing or Shortness of Breath. Rescue, Disp: 500 mL, Rfl: 12    aspirin 81 MG Chew, Take 1 tablet (81 mg total) by mouth once daily., Disp: 90 tablet, Rfl: 3    GAVILYTE-C 240-22.72-6.72 -5.84 gram SolR, Take by mouth., Disp: , Rfl:     MULTIVIT-IRON-MIN-FOLIC ACID 3,500-18-0.4 UNIT-MG-MG ORAL CHEW, Take by mouth., Disp: , Rfl:     sodium chloride 3% 3 % nebulizer solution, 4 cc of 3% saline into nebs twice daily prn, Disp: 360 mL, Rfl: 3    sodium chloride 3% 3 % nebulizer solution, 4 cc of 3% saline into nebs twice daily prn, Disp: 360 mL, Rfl: 3    TRELEGY ELLIPTA 200-62.5-25 mcg inhaler, INHALE 1 PUFF INTO THE LUNGS EVERY DAY, Disp: 180 each, Rfl: 3  No current facility-administered medications for this visit.    REVIEW OF SYSTEMS:  ROS       VITALS:  Vitals:    04/02/25 1058   BP: (!) 155/68   BP Location: Right arm   Patient Position: Sitting   Pulse: 68   Weight: 64.4 kg (142 lb 1.4 oz)   Height: 5' 9" (1.753 m)        PHYSICAL EXAM:   Physical Exam         LAB RESULTS:  No results found for: "CBC"  Lab Results   Component Value Date    LABPROT 10.9 08/23/2022    INR 1.1 08/23/2022     Lab Results   Component Value Date     08/27/2024    K 5.4 (H) 08/27/2024     08/27/2024    CO2 29 08/27/2024    GLU 87 08/27/2024    BUN 11 08/27/2024    CREATININE 1.1 04/02/2025    CALCIUM 9.8 08/27/2024    ANIONGAP 9 08/27/2024    EGFRNONAA >60.0 03/19/2016     Lab Results   Component Value Date    WBC 9.37 08/27/2024    RBC 5.38 08/27/2024    HGB 16.7 08/27/2024    HCT 50.9 08/27/2024    MCV 95 08/27/2024    MCH 31.0 08/27/2024    MCHC 32.8 08/27/2024    RDW 14.6 (H) 08/27/2024    "  08/27/2024    MPV 10.5 08/27/2024    GRAN 6.6 08/27/2024    GRAN 69.9 08/27/2024    LYMPH 1.8 08/27/2024    LYMPH 19.2 08/27/2024    MONO 0.7 08/27/2024    MONO 7.7 08/27/2024    EOS 0.2 08/27/2024    BASO 0.08 08/27/2024    EOSINOPHIL 1.9 08/27/2024    BASOPHIL 0.9 08/27/2024    DIFFMETHOD Automated 08/27/2024     .  Lab Results   Component Value Date    HGBA1C 5.6 03/19/2016       IMAGING:  All pertinent imaging has been reviewed and interpreted independently.  I reviewed his aortic duplex as well as CTA from 2023 and his CT non con study from October 1st.  10/08/2024 - I reviewed his CT abdomen and pelvis without contrast from October 1, 2024 which demonstrates infrarenal abdominal aortic aneurysms measuring up to 3.3 cm as well as a left common iliac artery aneurysms measuring up to 2.9 cm.  Does not yet meet size threshold for repair.      I reviewed CTA from today which demonstrates stable infrarenal AAA and left common iliac artery aneurysms measuring up to 3.3 in 2.9 cm respectively.    IMP/PLAN:  Dieter Ayala is a 69 y.o. male smoking and COPD who was incidentally found to have an infrarenal aortic iliac artery aneurysms , stable in size from prior imaging.  asymptomatic.  Does not meet size threshold for repair    Plan:    - return to clinic in 6 months with non con CT abdomen and pelvis for aortoiliac aneurysms surveillance  - Cont ASA  -continue smoking cessation    I spent 40 minutes evaluating this patient and greater than 50% of the time was spent counseling, coordinator care and discussing the plan of care.  All questions were answered and patient stated understanding with agreement with the above treatment plan.    Romero Fabian MD, PhD  [facility]

## 2025-04-14 ENCOUNTER — CLINICAL SUPPORT (OUTPATIENT)
Dept: SMOKING CESSATION | Facility: CLINIC | Age: 70
End: 2025-04-14
Payer: MEDICARE

## 2025-04-14 DIAGNOSIS — F17.200 NICOTINE DEPENDENCE: Primary | ICD-10-CM

## 2025-04-14 DIAGNOSIS — I71.43 INFRARENAL ABDOMINAL AORTIC ANEURYSM (AAA) WITHOUT RUPTURE: Primary | ICD-10-CM

## 2025-04-14 PROCEDURE — 99407 BEHAV CHNG SMOKING > 10 MIN: CPT | Mod: HCNC,,,

## 2025-04-14 NOTE — PROGRESS NOTES
Spoke with patient's wife today in regard to smoking cessation progress for 6-month telephone follow up.  Wife states that her  is tobacco free.  Commended patient on their quit.  She states he is doing well with his quit and has not had any slip ups.  Informed her of patient benefit period, future follow up, and contact information if any further help or support is needed.  Will complete smart form for 3/6 month follow up on Quit attempt #1.

## 2025-04-16 ENCOUNTER — OFFICE VISIT (OUTPATIENT)
Dept: CARDIOLOGY | Facility: CLINIC | Age: 70
End: 2025-04-16
Payer: MEDICARE

## 2025-04-16 VITALS
WEIGHT: 141.63 LBS | HEIGHT: 69 IN | RESPIRATION RATE: 18 BRPM | DIASTOLIC BLOOD PRESSURE: 82 MMHG | OXYGEN SATURATION: 97 % | SYSTOLIC BLOOD PRESSURE: 140 MMHG | HEART RATE: 67 BPM | BODY MASS INDEX: 20.98 KG/M2

## 2025-04-16 DIAGNOSIS — Z01.818 PRE-OP EVALUATION: ICD-10-CM

## 2025-04-16 DIAGNOSIS — I10 ESSENTIAL HYPERTENSION: Primary | ICD-10-CM

## 2025-04-16 PROCEDURE — 99999 PR PBB SHADOW E&M-EST. PATIENT-LVL V: CPT | Mod: PBBFAC,HCNC,, | Performed by: INTERNAL MEDICINE

## 2025-04-16 RX ORDER — ATORVASTATIN CALCIUM 40 MG/1
40 TABLET, FILM COATED ORAL DAILY
Qty: 90 TABLET | Refills: 3 | Status: SHIPPED | OUTPATIENT
Start: 2025-04-16 | End: 2026-04-16

## 2025-04-16 RX ORDER — ASPIRIN 81 MG/1
81 TABLET ORAL DAILY
Qty: 90 TABLET | Refills: 3 | Status: SHIPPED | OUTPATIENT
Start: 2025-04-16 | End: 2026-04-16

## 2025-04-16 RX ORDER — AMLODIPINE BESYLATE 2.5 MG/1
2.5 TABLET ORAL DAILY
Qty: 90 TABLET | Refills: 3 | Status: SHIPPED | OUTPATIENT
Start: 2025-04-16 | End: 2026-04-16

## 2025-04-16 NOTE — PROGRESS NOTES
CARDIOVASCULAR PROGRESS NOTE    REASON FOR CONSULT:   Dieter Ayala is a 69 y.o. male who presents for establishment of cardiology care.    CARDIOVASCULAR HISTORY:   Hypertension  Abdominal aortic calcification  AAA (3.4 cm - CT 4/2025)  Heavy smoking history  - quit in October, 2024    HISTORY OF PRESENT ILLNESS:     The purpose of today's visit is general cardiovascular well being.  He has COPD (not on home oxygen).  He has exertional shortness of breath but has not noticed any worsening in that.  No chest pain.  No orthopnea or PND.  No lower extremity swelling.    Follows up with vascular surgery for abdominal aortic aneurysm surveillance.  Last CT done in April, 2025 showed stable appearing abdominal aortic aneurysm around 3.4 cm.    Follows up with pulmonology as well for COPD management.    Social drinking.    Family history of premature CAD.    PAST MEDICAL HISTORY:     Past Medical History:   Diagnosis Date    COPD (chronic obstructive pulmonary disease)     Hypertension     Tobacco dependence 03/18/2016       PAST SURGICAL HISTORY:     Past Surgical History:   Procedure Laterality Date    ADENOIDECTOMY      APPENDECTOMY      COLONOSCOPY N/A 10/22/2024    Procedure: COLONOSCOPY;  Surgeon: Anthony Tinajero MD;  Location: [facility];  Service: Endoscopy;  Laterality: N/A;  8/21 ref by JUAN PETERS, ART, instr. to portal, pulmonary clearance pending-st  per Dr Yancey-Patient is optimized from pulmonary perspective for colonoscopy-GT  10/14 moved back 15 min - LW  10/15/24- lvm/portal for pc. DBM    TONSILLECTOMY         ALLERGIES AND MEDICATION:     Review of patient's allergies indicates:   Allergen Reactions    Sulfa (sulfonamide antibiotics) Hives        Medication List            Accurate as of April 16, 2025 10:55 AM. If you have any questions, ask your nurse or doctor.                START taking these medications      amLODIPine 2.5 MG tablet  Commonly known as: NORVASC  Take 1 tablet (2.5 mg total) by  mouth once daily.  Started by: Corey Kim MD     atorvastatin 40 MG tablet  Commonly known as: LIPITOR  Take 1 tablet (40 mg total) by mouth once daily.  Started by: Corey Kim MD            CHANGE how you take these medications      * aspirin 81 MG Chew  Take 1 tablet (81 mg total) by mouth once daily.  What changed: Another medication with the same name was added. Make sure you understand how and when to take each.  Changed by: Corey Kim MD     * aspirin 81 MG EC tablet  Commonly known as: ECOTRIN  Take 1 tablet (81 mg total) by mouth once daily.  What changed: You were already taking a medication with the same name, and this prescription was added. Make sure you understand how and when to take each.  Changed by: Corey Kim MD           * This list has 2 medication(s) that are the same as other medications prescribed for you. Read the directions carefully, and ask your doctor or other care provider to review them with you.                CONTINUE taking these medications      * albuterol 90 mcg/actuation inhaler  Commonly known as: PROVENTIL/VENTOLIN HFA  Inhale 1-2 puffs into the lungs every 6 (six) hours as needed for Wheezing or Shortness of Breath. Rescue     * albuterol 0.63 mg/3 mL Nebu  Commonly known as: ACCUNEB  Take 3 mLs (0.63 mg total) by nebulization every 6 (six) hours as needed. Rescue     * albuterol 90 mcg/actuation inhaler  Commonly known as: PROVENTIL/VENTOLIN HFA  Inhale 2 puffs into the lungs every 4 (four) hours as needed for Wheezing or Shortness of Breath. Rescue     albuterol-ipratropium 2.5 mg-0.5 mg/3 mL nebulizer solution  Commonly known as: DUO-NEB  Take 3 mLs by nebulization every 6 (six) hours as needed for Wheezing or Shortness of Breath. Rescue     GAVILYTE-C 240-22.72-6.72 -5.84 gram Solr  Generic drug: polyethylene glycol     multivit-iron-min-folic acid 3,500-18-0.4 unit-mg-mg Chew     * sodium chloride 3% 3 % nebulizer  solution  4 cc of 3% saline into nebs twice daily prn     * sodium chloride 3% 3 % nebulizer solution  4 cc of 3% saline into nebs twice daily prn     TRELEGY ELLIPTA 200-62.5-25 mcg inhaler  Generic drug: fluticasone-umeclidin-vilanter  INHALE 1 PUFF INTO THE LUNGS EVERY DAY           * This list has 5 medication(s) that are the same as other medications prescribed for you. Read the directions carefully, and ask your doctor or other care provider to review them with you.                   Where to Get Your Medications        These medications were sent to [id number] - [place] - [address] AT [facility] [address]  [address] [place] [address]      Phone: [phone]   amLODIPine 2.5 MG tablet  aspirin 81 MG EC tablet  atorvastatin 40 MG tablet         SOCIAL HISTORY:   Social History[1]    FAMILY HISTORY:     Family History   Problem Relation Name Age of Onset    Heart disease Mother      No Known Problems Father      No Known Problems Sister      ALS Brother      No Known Problems Maternal Aunt      No Known Problems Maternal Uncle      No Known Problems Paternal Aunt      No Known Problems Paternal Uncle      No Known Problems Maternal Grandmother      No Known Problems Maternal Grandfather      No Known Problems Paternal Grandmother      No Known Problems Paternal Grandfather         REVIEW OF SYSTEMS:   Review of Systems   Constitutional:  Negative for chills and fever.   HENT:  Negative for hearing loss and tinnitus.    Eyes:  Negative for blurred vision and double vision.   Cardiovascular:  Positive for chest pain. Negative for palpitations and orthopnea.   Gastrointestinal:  Negative for heartburn and nausea.   Genitourinary:  Negative for dysuria and urgency.   Neurological:  Negative for dizziness and headaches.   Endo/Heme/Allergies:  Does not bruise/bleed easily.   Psychiatric/Behavioral:  Negative for depression.            PHYSICAL EXAM:  "    Vitals:    25 1007   BP: (!) 140/82   Pulse: 67   Resp: 18    Body mass index is 20.92 kg/m².  Weight: 64.3 kg (141 lb 10.3 oz)   Height: 5' 9" (175.3 cm)     Gen: NAD  Head/Eyes/Ears/Nose: MMM, good dentition   Neck: No carotid bruits, no JVD  Lung: Clear to auscultation bilaterally, no wheezes/rales/ronchi, symmetrical lung expansion with inspiration  Heart: Normal S1/S2, regular rate and rhythm, no murmurs/rubs/gallops  Abdomen: Soft, NT/ND, no masses  Extremities: No lower extremity edema.  No wounds or other skin lesions  Skin: Normal color and turgor. No wounds rashes, no petechia, no ecchymoses.   Neuro: AAOx3    DATA:     Laboratory:  CBC:  Recent Labs   Lab 22  1126 24  1118   WBC 7.70 9.37   Hemoglobin 15.8 16.7   Hematocrit 46.4 50.9   Platelets 225 237       CHEMISTRIES:  Recent Labs   Lab 22  1126 24  1118 25  0951   Glucose 84 87  --    Sodium 142 141  --    Potassium 4.8 5.4 H  --    BUN 10 11  --    Creatinine 1.1 1.1 1.1   eGFR >60.0 >60.0 >60   Calcium 9.7 9.8  --        CARDIAC BIOMARKERS:        HBA1C:  Hemoglobin A1C   Date Value Ref Range Status   2016 5.6 4.5 - 6.2 % Final        COAGS:  Recent Labs   Lab 22  1126   INR 1.1       LIPIDS/LFTS:  Recent Labs   Lab 22  1126 24  1118   Cholesterol 170 184   Triglycerides 79 78   HDL 50 60   LDL Cholesterol 104.2 108.4   Non-HDL Cholesterol 120 124   AST 17 22   ALT 13 15         CARDIAC DIAGNOSTICS:  :     [id number]  Sinus rhythm, right atrial enlargement, low-voltage QRS complex without any ST-T wave change    2023  Sinus rhythm, right atrial enlargement, low-voltage QRS complex without any ST-T wave change    ECHO  2024    Left Ventricle: The left ventricle is normal in size. There is normal systolic function with a visually estimated ejection fraction of 55 - 60%. Grade I diastolic dysfunction.    Right Ventricle: Normal right ventricular cavity size. Systolic function " is normal.    Pulmonary Artery: The estimated pulmonary artery systolic pressure is 13 mmHg.    IVC/SVC: Normal venous pressure at 3 mmHg.    3.  STRESS TEST  NA    4.  CARDIAC CATHETERIZATION  NA    5.  IMAGING   CT chest abdomen pelvis in April, 2025 showed moderate aortic calcification.    The abdominal aorta is tortuous and contains moderate calcification as well as some noncalcified thrombus. It measures up to 3.5 cm on coronal series 601, image 58. 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     6. OTHERS  .4 (8/2024)  A1C 5.6 (3/2016)    The 10-year ASCVD risk score (Tayler LUNDBERG, et al., 2019) is: 18.5%    Values used to calculate the score:      Age: 69 years      Sex: Male      Is Non- : No      Diabetic: No      Tobacco smoker: No      Systolic Blood Pressure: 140 mmHg      Is BP treated: Yes      HDL Cholesterol: 60 mg/dL      Total Cholesterol: 184 mg/dL      ASSESSMENT:   Hypertension  Hyperlipidemia  Stable appearing abdominal aortic aneurysm (3.4 cm on CT scan done in April, 2024)  Abdominal aortic calcification  Extensive smoking history  COPD not on home oxygen    PLAN:   Enrolled in digital hypertension program  Start amlodipine 2.5 mg daily.  Patient and his wife wants to control blood pressure with diet and exercise.  They were reluctant to start pharmacologic therapy.  Told them that his prior blood pressure readings were high and antihypertensive therapy initiation is recommended.  They will keep a log of blood pressure and we will update us  Start baby aspirin  Start atorvastatin 40 mg daily  Repeat lipid profile and LFTs in October, 2025  To consider stress test if he continues to have exertional shortness of breath even after COPD management  Follow with vascular surgery and pulmonology as planned  Mediterranean diet and daily light exercise    Follow up in 3-4 months      Corey Colvin  MD Julio  [facility]    This note was created by combination of typed  and M-Modal dictation.   Transcription errors may be present.          [1]   Social History  Socioeconomic History    Marital status:    Tobacco Use    Smoking status: Former     Current packs/day: 1.00     Average packs/day: 1 pack/day for 50.0 years (50.0 ttl pk-yrs)     Types: Cigarettes     Passive exposure: Past    Smokeless tobacco: Never    Tobacco comments:     Pt currently taking chantix for ceassation   Substance and Sexual Activity    Alcohol use: Yes     Comment: rarely    Drug use: Never    Sexual activity: Yes     Social Drivers of Health     Financial Resource Strain: Low Risk  (4/14/2025)    Overall Financial Resource Strain (CARDIA)     Difficulty of Paying Living Expenses: Not very hard   Food Insecurity: No Food Insecurity (4/14/2025)    Hunger Vital Sign     Worried About Running Out of Food in the Last Year: Never true     Ran Out of Food in the Last Year: Never true   Transportation Needs: No Transportation Needs (4/14/2025)    PRAPARE - Transportation     Lack of Transportation (Medical): No     Lack of Transportation (Non-Medical): No   Physical Activity: Inactive (4/14/2025)    Exercise Vital Sign     Days of Exercise per Week: 0 days     Minutes of Exercise per Session: 20 min   Stress: No Stress Concern Present (8/27/2024)    [employer] - Occupational Stress Questionnaire     Feeling of Stress : Not at all   Housing Stability: Low Risk  (4/14/2025)    Housing Stability Vital Sign     Unable to Pay for Housing in the Last Year: No     Homeless in the Last Year: No

## 2025-06-09 ENCOUNTER — PATIENT MESSAGE (OUTPATIENT)
Dept: ADMINISTRATIVE | Facility: HOSPITAL | Age: 70
End: 2025-06-09
Payer: MEDICARE

## 2025-07-14 ENCOUNTER — OFFICE VISIT (OUTPATIENT)
Dept: URGENT CARE | Facility: CLINIC | Age: 70
End: 2025-07-14
Payer: MEDICARE

## 2025-07-14 VITALS
BODY MASS INDEX: 20.88 KG/M2 | HEART RATE: 80 BPM | OXYGEN SATURATION: 95 % | TEMPERATURE: 98 F | RESPIRATION RATE: 23 BRPM | WEIGHT: 141 LBS | SYSTOLIC BLOOD PRESSURE: 126 MMHG | DIASTOLIC BLOOD PRESSURE: 86 MMHG | HEIGHT: 69 IN

## 2025-07-14 DIAGNOSIS — J44.1 COPD EXACERBATION: Primary | ICD-10-CM

## 2025-07-14 DIAGNOSIS — R05.1 ACUTE COUGH: ICD-10-CM

## 2025-07-14 PROCEDURE — 99213 OFFICE O/P EST LOW 20 MIN: CPT | Mod: S$GLB,,, | Performed by: FAMILY MEDICINE

## 2025-07-14 PROCEDURE — 71046 X-RAY EXAM CHEST 2 VIEWS: CPT | Mod: S$GLB,,, | Performed by: RADIOLOGY

## 2025-07-14 RX ORDER — PREDNISONE 20 MG/1
40 TABLET ORAL DAILY
Qty: 10 TABLET | Refills: 0 | Status: SHIPPED | OUTPATIENT
Start: 2025-07-14 | End: 2025-07-19

## 2025-07-14 RX ORDER — GUAIFENESIN 600 MG/1
1200 TABLET, EXTENDED RELEASE ORAL 2 TIMES DAILY
Qty: 40 TABLET | Refills: 0 | Status: SHIPPED | OUTPATIENT
Start: 2025-07-14 | End: 2025-07-24

## 2025-07-14 RX ORDER — PREDNISONE 20 MG/1
60 TABLET ORAL
Status: COMPLETED | OUTPATIENT
Start: 2025-07-14 | End: 2025-07-14

## 2025-07-14 RX ORDER — BUDESONIDE 0.5 MG/2ML
0.5 INHALANT ORAL 2 TIMES DAILY
Qty: 120 ML | Refills: 1 | Status: SHIPPED | OUTPATIENT
Start: 2025-07-14 | End: 2025-08-13

## 2025-07-14 RX ADMIN — PREDNISONE 60 MG: 20 TABLET ORAL at 06:07

## 2025-07-14 NOTE — PROGRESS NOTES
"Subjective:      Patient ID: Dieter Ayala is a 70 y.o. male.    Vitals:  height is 5' 9" (1.753 m) and weight is 64 kg (141 lb). His temperature is 97.7 °F (36.5 °C). His blood pressure is 126/86 and his pulse is 80. His respiration is 23 (abnormal) and oxygen saturation is 95%.     Chief Complaint: Shortness of Breath    Pt is here for Sob that onset a week ago.pt states no pain. He reports last flare up jan 2024, quit smoking in oct 2024. He is on trelegy, albuterol and neb at home. He has been using duoneb at home.     Shortness of Breath  This is a new problem. The current episode started 1 to 4 weeks ago. The problem occurs constantly. The problem has been gradually worsening. Pertinent negatives include no fever, hemoptysis, sputum production or wheezing. The symptoms are aggravated by any activity. Associated symptoms comments: Light headed, chest congestion. He has tried nothing for the symptoms.       Constitution: Negative for activity change, appetite change, chills, sweating, fatigue and fever.   Respiratory:  Positive for COPD and shortness of breath. Negative for chest tightness, cough, sputum production, bloody sputum, stridor, wheezing and asthma.    Allergic/Immunologic: Negative for asthma.      Objective:     Physical Exam   Constitutional: He is oriented to person, place, and time. He appears well-developed.  Non-toxic appearance. He does not appear ill. No distress.      Comments:Family present  Some pursed lip breathing     HENT:   Head: Normocephalic and atraumatic.   Ears:   Right Ear: External ear normal.   Left Ear: External ear normal.   Nose: Nose normal.   Mouth/Throat: Oropharynx is clear and moist.   Eyes: Conjunctivae, EOM and lids are normal. Pupils are equal, round, and reactive to light.   Neck: Trachea normal and phonation normal. Neck supple.   Cardiovascular: Normal rate.   Pulmonary/Chest: Effort normal and breath sounds normal.   Musculoskeletal: Normal range of motion.    "      General: Normal range of motion.   Neurological: He is alert and oriented to person, place, and time.   Skin: Skin is warm, dry, intact and not diaphoretic.   Psychiatric: His speech is normal and behavior is normal. Judgment and thought content normal.   Nursing note and vitals reviewed.    XR CHEST PA AND LATERAL  Result Date: 7/14/2025  EXAMINATION: XR CHEST PA AND LATERAL CLINICAL HISTORY: Acute cough TECHNIQUE: PA and lateral views of the chest were performed. COMPARISON: 01/22/2024. FINDINGS: The lungs are hyperexpanded and clear.  There are emphysematous changes.  No focal opacities are seen. The pleural spaces are clear. The cardiac silhouette is unremarkable.  There are calcifications of the aortic arch.  The visualized osseous structures are unremarkable.     No acute abnormality. Emphysema. Electronically signed by: Edy Rothman Date:    07/14/2025 Time:    18:56      Assessment:     1. COPD exacerbation    2. Acute cough        Plan:     I have discussed in detail with pt the side effects of steroids including mental changes, sleep disturbance, skin changes at the injection site, suicidal ideations, increased blood pressure, increased glucose, and DVT and PE.      Patient reports his last COPD exacerbation was January 2024 he has been doing well with the Trelegy, he has duo nebs at home if needed.  We can try adding budesonide to this and see if this helps while having the exacerbation.  He says he typically does well with prednisone.  I stressed close follow up with internal medicine in 2-3 days.    Discussed results/diagnosis/plan with patient in clinic. Strict precautions given to patient to monitor for worsening signs and symptoms. Advised to follow up with PCP or specialist.    Explained side effects of medications prescribed with patient and informed him/her to discontinue use if he/she has any side effects and to inform UC or PCP if this occurs. All questions answered. Strict ED verses  clinic return precautions stressed and given in depth. Advised if symptoms worsens of fail to improve he/she should go to the Emergency Room. Discharge and follow-up instructions given verbally/printed with the patient who expressed understanding and willingness to comply with my recommendations. Patient voiced understanding and in agreement with current treatment plan. Patient exits the exam room in no acute distress. Conversant and engaged during discharge discussion, verbalized understanding.      COPD exacerbation  -     predniSONE tablet 60 mg  -     predniSONE (DELTASONE) 20 MG tablet; Take 2 tablets (40 mg total) by mouth once daily. for 5 days  Dispense: 10 tablet; Refill: 0  -     budesonide (PULMICORT) 0.5 mg/2 mL nebulizer solution; Take 2 mLs (0.5 mg total) by nebulization 2 (two) times a day. Controller  Dispense: 120 mL; Refill: 1    Acute cough  -     XR CHEST PA AND LATERAL; Future; Expected date: 07/14/2025  -     guaiFENesin (MUCINEX) 600 mg 12 hr tablet; Take 2 tablets (1,200 mg total) by mouth 2 (two) times daily. for 10 days  Dispense: 40 tablet; Refill: 0          Medical Decision Making:   History:   Old Medical Records: I decided to obtain old medical records.  Old Records Summarized: records from clinic visits.  Clinical Tests:   Radiological Study: Ordered and Reviewed    Additional MDM:     Heart Failure Score:   COPD = Yes

## 2025-07-15 NOTE — PATIENT INSTRUCTIONS
General Discharge Instructions   PLEASE READ YOUR DISCHARGE INSTRUCTIONS ENTIRELY AS IT CONTAINS IMPORTANT INFORMATION.  If you were prescribed a narcotic or controlled medication, do not drive or operate heavy equipment or machinery while taking these medications.  If you were prescribed antibiotics, please take them to completion.  You must understand that you've received an Urgent Care treatment only and that you may be released before all your medical problems are known or treated. You, the patient, will arrange for follow up care as instructed.    Tylenol up to 4,000 mg a day is safe for short periods and can be used for body aches, pain, and fever. However in high doses and prolonged use it can cause liver irritation.    Ibuprofen is a non-steroidal anti-inflammatory that can be used for body aches, pain, and fever.However it can also cause stomach irritation if over used.     Follow up with your PCP or specialty clinic as instructed in the next 2-3 days if not improved or as needed. You can call [phone] to schedule an appointment with appropriate provider.      If you condition worsens, we recommend that you receive another evaluation at the emergency room immediately or contact your primary medical clinic's after hours call service to discuss your concerns.      Please return here or go to the Emergency Department for any concerns or worsening condition.   You can also call [phone] to schedule an appointment with the appropriate provider.    Please return here or go to the Emergency Department for any concerns or worsening of condition.    Thank you for choosing [facility]!    Our goal in the Urgent Care is to always provide outstanding medical care. You may receive a survey by mail or e-mail in the next week regarding your experience today. We would greatly appreciate you completing and returning the survey. Your feedback provides us with a way to recognize our staff who provide very  good care, and it helps us learn how to improve when your experience was below our aspiration of excellence.      We appreciate you trusting us with your medical care. We hope you feel better soon. We will be happy to take care of you for all of your future medical needs.    Sincerely,    JOSE MARTIN Cruz

## 2025-07-16 ENCOUNTER — TELEPHONE (OUTPATIENT)
Dept: FAMILY MEDICINE | Facility: CLINIC | Age: 70
End: 2025-07-16
Payer: MEDICARE

## 2025-07-17 ENCOUNTER — OFFICE VISIT (OUTPATIENT)
Dept: FAMILY MEDICINE | Facility: CLINIC | Age: 70
End: 2025-07-17
Payer: MEDICARE

## 2025-07-17 VITALS
BODY MASS INDEX: 20.7 KG/M2 | SYSTOLIC BLOOD PRESSURE: 138 MMHG | DIASTOLIC BLOOD PRESSURE: 86 MMHG | OXYGEN SATURATION: 94 % | HEIGHT: 69 IN | TEMPERATURE: 98 F | HEART RATE: 89 BPM | WEIGHT: 139.75 LBS

## 2025-07-17 DIAGNOSIS — D75.1 POLYCYTHEMIA: ICD-10-CM

## 2025-07-17 DIAGNOSIS — I10 ESSENTIAL HYPERTENSION: ICD-10-CM

## 2025-07-17 DIAGNOSIS — J43.9 PULMONARY EMPHYSEMA, UNSPECIFIED EMPHYSEMA TYPE: ICD-10-CM

## 2025-07-17 DIAGNOSIS — Z12.5 SCREENING FOR PROSTATE CANCER: ICD-10-CM

## 2025-07-17 DIAGNOSIS — I70.0 AORTIC ATHEROSCLEROSIS: ICD-10-CM

## 2025-07-17 DIAGNOSIS — F17.200 TOBACCO DEPENDENCE: ICD-10-CM

## 2025-07-17 DIAGNOSIS — Z86.0100 HISTORY OF COLONIC POLYPS: ICD-10-CM

## 2025-07-17 DIAGNOSIS — J44.9 COPD, SEVERE: ICD-10-CM

## 2025-07-17 DIAGNOSIS — I71.43 INFRARENAL ABDOMINAL AORTIC ANEURYSM (AAA) WITHOUT RUPTURE: ICD-10-CM

## 2025-07-17 DIAGNOSIS — R63.4 UNINTENTIONAL WEIGHT LOSS: ICD-10-CM

## 2025-07-17 DIAGNOSIS — Z00.00 ROUTINE MEDICAL EXAM: Primary | ICD-10-CM

## 2025-07-17 DIAGNOSIS — J44.1 COPD EXACERBATION: ICD-10-CM

## 2025-07-17 DIAGNOSIS — N40.0 BENIGN PROSTATIC HYPERPLASIA, UNSPECIFIED WHETHER LOWER URINARY TRACT SYMPTOMS PRESENT: ICD-10-CM

## 2025-07-17 PROCEDURE — 99999 PR PBB SHADOW E&M-EST. PATIENT-LVL IV: CPT | Mod: PBBFAC,HCNC,, | Performed by: INTERNAL MEDICINE

## 2025-07-17 NOTE — PROGRESS NOTES
Chief complaint:   Physical exam 8/24    Patient is a 70-year-old white male here with his female partner new 8/22. .   Both of them really do not go see the doctor.  Last PSA 2022, 8/24 . Colon w 6 polyps 10/24 --2 adenomas -5 yr.  Reviewed the pathology report.  Wife thought he was told he can come back in 10 years but discussed with the adenomatous definitely five years            ROS:   CONST: weight stable but lost weight over two years.. EYES: no vision change. ENT: no sore throat. CV: no chest pain w/ exertion. RESP: no shortness of breath that is new.. GI: no nausea, vomiting, diarrhea. No dysphagia. : no urinary issues. MUSCULOSKELETAL: no new myalgias or arthralgias. SKIN: no new changes. NEURO: no focal deficits. PSYCH: no new issues. ENDOCRINE: no polyuria. HEME: no lymph nodes. ALLERGY: no general pruritis.      Past Medical History:   Diagnosis Date    COPD (chronic obstructive pulmonary disease)     Hypertension     Tobacco dependence 03/18/2016   Pos cologuard 9/22- 2 adenomas -5 yr -10/24    Past Surgical History:   Procedure Laterality Date    ADENOIDECTOMY      APPENDECTOMY      COLONOSCOPY N/A 10/22/2024    Procedure: COLONOSCOPY;  Surgeon: Anthony Tinajero MD;  Location: [facility];  Service: Endoscopy;  Laterality: N/A;  8/21 ref by JUAN PETERS, PEG, instr. to portal, pulmonary clearance pending-st  per Dr Yancey-Patient is optimized from pulmonary perspective for colonoscopy-GT  10/14 moved back 15 min - LW  10/15/24- lv/portal for pc. DBM    TONSILLECTOMY       Social History     Socioeconomic History    Marital status:    Tobacco Use    Smoking status: Former     Current packs/day: 1.00     Average packs/day: 1 pack/day for 50.0 years (50.0 ttl pk-yrs)     Types: Cigarettes     Passive exposure: Past    Smokeless tobacco: Never    Tobacco comments:     Patient Quit Smoking 10/3/2024.   Substance and Sexual Activity    Alcohol use: Yes     Comment: rarely    Drug use: Never     Sexual activity: Yes     Social Drivers of Health     Financial Resource Strain: Low Risk  (4/14/2025)    Overall Financial Resource Strain (CARDIA)     Difficulty of Paying Living Expenses: Not very hard   Food Insecurity: No Food Insecurity (4/14/2025)    Hunger Vital Sign     Worried About Running Out of Food in the Last Year: Never true     Ran Out of Food in the Last Year: Never true   Transportation Needs: No Transportation Needs (4/14/2025)    PRAPARE - Transportation     Lack of Transportation (Medical): No     Lack of Transportation (Non-Medical): No   Physical Activity: Inactive (4/14/2025)    Exercise Vital Sign     Days of Exercise per Week: 0 days     Minutes of Exercise per Session: 20 min   Stress: No Stress Concern Present (8/27/2024)    [employer] - Occupational Stress Questionnaire     Feeling of Stress : Not at all   Housing Stability: Low Risk  (4/14/2025)    Housing Stability Vital Sign     Unable to Pay for Housing in the Last Year: No     Homeless in the Last Year: No     family history includes ALS in his brother; Heart disease in his mother; No Known Problems in his father, maternal aunt, maternal grandfather, maternal grandmother, maternal uncle, paternal aunt, paternal grandfather, paternal grandmother, paternal uncle, and sister.  Sister had hepatitis B and apparently mom had some hepatitis B and so he has antibodies    Gen: no distress  EYES: conjunctiva clear, non-icteric, PERRL  ENT: nose clear, nasal mucosa normal, oropharynx clear and moist, teeth poor  NECK:supple, thyroid non-palpable  RESP: effort is good, lungs and expiratory wheezing throughout  CV: heart RRR w/o murmur, gallops or rubs; no carotid bruits, no edema  GI: abdomen soft, non-distended, non-tender,   MS: gait normal, no clubbing or cyanosis of the digits  SKIN: no rashes, warm to touch,       Prior labs reviewed         Dieter was seen today for annual exam.    Diagnoses and all orders  for this visit:    Routine medical exam, due for lab work, up-to-date on colonoscopy  -     Prostate Specific Antigen, Diagnostic; Future  -     TSH; Future  -     T4, Free; Future  -     Lipid Panel; Future  -     CBC Auto Differential; Future  -     Comprehensive Metabolic Panel; Future    Screening for prostate cancer  -     Prostate Specific Antigen, Diagnostic; Future    Screening for colorectal cancer/ 6 polyps, discussed pathophysiology of the adenomas and follow-up                                                                  Additional significant and separate evaluation and management issues:      Additionally patient has numerous other medical issues to address completely separate from those of a preventative visit and medically necessary that we address them today.      Now seeing Pulmonary for COPD -Spirometry shows very severe obstruction. Lung volume determination is normal. Spirometry shows borderline improvement following bronchodilator. DLCO is severely decreased.       Few days ago looks like he had a COPD exacerbation probably related to an acquired URI.  On the prednisone and budesonide nebulizers and albuterol bottle nebulizers he is doing well with improved breathing no further wheezing or shortness a breath.  No mucus production.  He says it when having the flare-up he could not get the Trelegy inhaler in he could spit the powder out and so forth so using the budesonide nebulizers was probably a replacement.  Now that he is breathing better you can probably go back to the Trelegy and albuterol PRN.     UC July 14th  Patient reports his last COPD exacerbation was January 2024 he has been doing well with the Trelegy, he has duo nebs at home if needed.  We can try adding budesonide to this and see if this helps while having the exacerbation.  He says he typically does well with prednisone.  I stressed close follow up with internal medicine in 2-3 days.   Discussed results/diagnosis/plan with  patient in clinic. Strict precautions given to patient to monitor for worsening signs and symptoms. Advised to follow up with PCP or specialist.    Explained side effects of medications prescribed with patient and informed him/her to discontinue use if he/she has any side effects and to inform UC or PCP if this occurs. All questions answered. Strict ED verses clinic return precautions stressed and given in depth. Advised if symptoms worsens of fail to improve he/she should go to the Emergency Room. Discharge and follow-up instructions given verbally/printed with the patient who expressed understanding and willingness to comply with my recommendations. Patient voiced understanding and in agreement with current treatment plan. Patient exits the exam room in no acute distress. Conversant and engaged during discharge discussion, verbalized understanding.     COPD exacerbation  -     predniSONE tablet 60 mg  -     predniSONE (DELTASONE) 20 MG tablet; Take 2 tablets (40 mg total) by mouth once daily. for 5 days  Dispense: 10 tablet; Refill: 0  -     budesonide (PULMICORT) 0.5 mg/2 mL nebulizer solution; Take 2 mLs (0.5 mg total) by nebulization 2 (two) times a day. Controller  Dispense: 120 mL; Refill: 1          Reviewed  COPD exacerbation.  Discussed the pathophysiology of them.  He recently had a URI but does not appear to have led to an exacerbation.  He needs to monitor with ambulation but apparently did have a walk test with and without oxygen recently and apparently he is not on home oxygen.  Looks like they gave him some  DuoNeb in he needs a refill of that.  Also discussed probable underlying COPD in the benefits of inhaled steroids and inhaled bronchodilators so will try to prescribe Trelegy which does appear to be preferred on his insurance but does not have part D now. .  We did look up [place] on the good Rx plan was about 98 dollars and they might be able to afford that currently.  We discussed acute  "treatment with albuterol and the DuoNeb may not be covered.  We discussed issues regarding Medicare coverage of the albuterol and the nebulizer and gave them printed prescriptions for the nebulizer.  He already has the tubing here with him in the office.  We discussed doing PFTs when his COPD exacerbation improves and he gets back to baseline.  We discussed emphysema.  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    9/27 ER -------Dieter Ayala is a 68 y.o. male, with a PMHx of COPD, hypertension, tobacco dependence who presents to the ED with shortness of breath for "a few days". Patient reports he was seen at this facility on 9/18/2023, was prescribed prednisone and azithromycin with for a few days. He states he notices symptoms are exacerbated with stress. 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 Has appointment with PCP in a few weeks  CXR -Lungs are hyperinflated and there is severe pulmonary emphysema.  Coarsened perihilar interstitial lung markings but no confluent area of consolidation.  Heart is not enlarged.  Atherosclerotic calcifications overlie the aortic arch.  Symmetric nodular densities overlie the lung bases, presumably nipple shadows.  No sizable pleural effusion.  No pneumothorax.   Given DuoNebs in the ED with significant improvement in symptoms.  Patient is requesting a prescription for a nebulizer machine for home use.  I will also prescribe DuoNeb solution, refill albuterol inhaler, and prescribed Mucinex.  I have encouraged follow up with PCP and pulmonology for further evaluation and management.    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  He lot of coughing and his blood pressure was high offshore but normal by the time he got to the ER.  [facility]  He was not given an albuterol treatment or any antibiotics.  He was given prednisone and a steroid shot.  His wife gave him some leftover antibiotics from a tooth and he did get better over about two week period of time and discussed he may well have been getting better on his own and antibiotic probably did not give him any benefit.  He does have some occasional coughing when he breathes or talks which likely suggest allergies and they have other allergy symptoms we discussed using a Claritin every day.  He did try the albuterol inhaler given any felt funny and we did discuss that it can increase heart rate and make you shaky.  He was holding his breath 10 seconds in between each puff which also likely made him lightheaded.      We discussed COPD at length as well as the extensive emphysema seen on the CT and that is irreversible.  There could be a reversible component if he has congestion and wheezing which could be smoker's cough, allergies and or COPD.  I encouraged him to use the inhaler to see if it allows him to do more with less shortness of breath but otherwise he really did not have any respiratory compromise prior.  We discussed maintenance medication with inhaled steroid should he find benefit from the albuterol and need to use the albuterol fairly often.     He is he has noticed some bruising on his forearms only.  We discussed is probably normal in age related due to minor trauma.  No other bruising or bleeding.  No other known history of any liver disease.    Lab work was all normal.        He has had decreased appetite over the last couple of years and lost about 45 lb over two years.  No early satiety or dysphagia.  He does not have symptoms of depression.  No symptoms of thyroid dysfunction or tachycardia.  He has a brother with AL S.  He does smoke one pack per day.   His dyspnea on exertion is stable.  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  He does have expiratory wheezing counseling and probably has underlying COPD.    AAA -4/25- CTA from today which demonstrates stable infrarenal AAA and left common iliac artery aneurysms measuring up to 3.3 in 2.9 cm respectively.   6 mo check      Evaluation and management of the separate issues will be based on medical decision making.  Labs, x-ray reports, PFTs and interval clinic notes reviewed     Blood pressure elevated at time and they do not have a cuff.  Will try signing him up for the digital hypertension program          Assessment and plan:           COPD exacerbation, responding to steroids and therapy, probably okay to switch off steroid nebs back to [facility]    COPD, severe, on appropriate medications    Essential hypertension, try to monitor at home but will sign up on digital hypertension to at least assist in getting a cuff  -     TSH; Future  -     CBC Auto Differential; Future  -     Comprehensive Metabolic Panel; Future  -     Lipid Panel; Future  -     Hypertension Digital Medicine (HDMP) Enrollment Order    Pulmonary emphysema, unspecified emphysema type, chronic and stable    Unintentional weight loss, chronic and stable    Tobacco dependence    Aortic atherosclerosis, chronic and stable    Polycythemia  -     CBC Auto Differential; Future    Infrarenal abdominal aortic aneurysm (AAA) without rupture, reviewed interval vascular evaluation, has proper follow-up    Benign prostatic hyperplasia, unspecified whether lower urinary tract symptoms present  -     Prostate Specific Antigen, Diagnostic; Future

## 2025-07-22 ENCOUNTER — LAB VISIT (OUTPATIENT)
Dept: LAB | Facility: HOSPITAL | Age: 70
End: 2025-07-22
Attending: INTERNAL MEDICINE
Payer: MEDICARE

## 2025-07-22 DIAGNOSIS — N40.0 BENIGN PROSTATIC HYPERPLASIA, UNSPECIFIED WHETHER LOWER URINARY TRACT SYMPTOMS PRESENT: ICD-10-CM

## 2025-07-22 DIAGNOSIS — D75.1 POLYCYTHEMIA: ICD-10-CM

## 2025-07-22 DIAGNOSIS — I10 ESSENTIAL HYPERTENSION: ICD-10-CM

## 2025-07-22 DIAGNOSIS — Z12.5 SCREENING FOR PROSTATE CANCER: ICD-10-CM

## 2025-07-22 DIAGNOSIS — Z00.00 ROUTINE MEDICAL EXAM: ICD-10-CM

## 2025-07-22 LAB
ABSOLUTE EOSINOPHIL (OHS): 0.51 K/UL
ABSOLUTE MONOCYTE (OHS): 1.48 K/UL (ref 0.3–1)
ABSOLUTE NEUTROPHIL COUNT (OHS): 8.64 K/UL (ref 1.8–7.7)
ALBUMIN SERPL BCP-MCNC: 3.9 G/DL (ref 3.5–5.2)
ALP SERPL-CCNC: 76 UNIT/L (ref 40–150)
ALT SERPL W/O P-5'-P-CCNC: 19 UNIT/L (ref 10–44)
ANION GAP (OHS): 10 MMOL/L (ref 8–16)
AST SERPL-CCNC: 17 UNIT/L (ref 11–45)
BASOPHILS # BLD AUTO: 0.08 K/UL
BASOPHILS NFR BLD AUTO: 0.6 %
BILIRUB SERPL-MCNC: 0.7 MG/DL (ref 0.1–1)
BUN SERPL-MCNC: 16 MG/DL (ref 8–23)
CALCIUM SERPL-MCNC: 9.4 MG/DL (ref 8.7–10.5)
CHLORIDE SERPL-SCNC: 100 MMOL/L (ref 95–110)
CHOLEST SERPL-MCNC: 159 MG/DL (ref 120–199)
CHOLEST/HDLC SERPL: 3 {RATIO} (ref 2–5)
CO2 SERPL-SCNC: 28 MMOL/L (ref 23–29)
CREAT SERPL-MCNC: 1.2 MG/DL (ref 0.5–1.4)
ERYTHROCYTE [DISTWIDTH] IN BLOOD BY AUTOMATED COUNT: 14.4 % (ref 11.5–14.5)
GFR SERPLBLD CREATININE-BSD FMLA CKD-EPI: >60 ML/MIN/1.73/M2
GLUCOSE SERPL-MCNC: 95 MG/DL (ref 70–110)
HCT VFR BLD AUTO: 52.6 % (ref 40–54)
HDLC SERPL-MCNC: 53 MG/DL (ref 40–75)
HDLC SERPL: 33.3 % (ref 20–50)
HGB BLD-MCNC: 17.1 GM/DL (ref 14–18)
IMM GRANULOCYTES # BLD AUTO: 0.1 K/UL (ref 0–0.04)
IMM GRANULOCYTES NFR BLD AUTO: 0.8 % (ref 0–0.5)
LDLC SERPL CALC-MCNC: 85 MG/DL (ref 63–159)
LYMPHOCYTES # BLD AUTO: 2.37 K/UL (ref 1–4.8)
MCH RBC QN AUTO: 30.6 PG (ref 27–31)
MCHC RBC AUTO-ENTMCNC: 32.5 G/DL (ref 32–36)
MCV RBC AUTO: 94 FL (ref 82–98)
NONHDLC SERPL-MCNC: 106 MG/DL
NUCLEATED RBC (/100WBC) (OHS): 0 /100 WBC
PLATELET # BLD AUTO: 223 K/UL (ref 150–450)
PMV BLD AUTO: 10.7 FL (ref 9.2–12.9)
POTASSIUM SERPL-SCNC: 5.4 MMOL/L (ref 3.5–5.1)
PROT SERPL-MCNC: 7.1 GM/DL (ref 6–8.4)
PSA SERPL-MCNC: 0.66 NG/ML
RBC # BLD AUTO: 5.59 M/UL (ref 4.6–6.2)
RELATIVE EOSINOPHIL (OHS): 3.9 %
RELATIVE LYMPHOCYTE (OHS): 18 % (ref 18–48)
RELATIVE MONOCYTE (OHS): 11.2 % (ref 4–15)
RELATIVE NEUTROPHIL (OHS): 65.5 % (ref 38–73)
SODIUM SERPL-SCNC: 138 MMOL/L (ref 136–145)
TRIGL SERPL-MCNC: 105 MG/DL (ref 30–150)
TSH SERPL-ACNC: 3.14 UIU/ML (ref 0.4–4)
WBC # BLD AUTO: 13.18 K/UL (ref 3.9–12.7)

## 2025-07-22 PROCEDURE — 84443 ASSAY THYROID STIM HORMONE: CPT | Mod: HCNC

## 2025-07-22 PROCEDURE — 84153 ASSAY OF PSA TOTAL: CPT | Mod: HCNC

## 2025-07-22 PROCEDURE — 36415 COLL VENOUS BLD VENIPUNCTURE: CPT | Mod: HCNC,PO

## 2025-07-22 PROCEDURE — 82465 ASSAY BLD/SERUM CHOLESTEROL: CPT | Mod: HCNC

## 2025-07-22 PROCEDURE — 85025 COMPLETE CBC W/AUTO DIFF WBC: CPT | Mod: HCNC

## 2025-07-22 PROCEDURE — 84075 ASSAY ALKALINE PHOSPHATASE: CPT | Mod: HCNC
